# Patient Record
Sex: FEMALE | Race: WHITE
[De-identification: names, ages, dates, MRNs, and addresses within clinical notes are randomized per-mention and may not be internally consistent; named-entity substitution may affect disease eponyms.]

---

## 2017-04-12 ENCOUNTER — HOSPITAL ENCOUNTER (OUTPATIENT)
Dept: HOSPITAL 62 - SC | Age: 27
Discharge: HOME | End: 2017-04-12
Attending: OTOLARYNGOLOGY
Payer: OTHER GOVERNMENT

## 2017-04-12 DIAGNOSIS — Z79.1: ICD-10-CM

## 2017-04-12 DIAGNOSIS — Z79.899: ICD-10-CM

## 2017-04-12 DIAGNOSIS — F90.9: ICD-10-CM

## 2017-04-12 DIAGNOSIS — F17.210: ICD-10-CM

## 2017-04-12 DIAGNOSIS — J35.1: Primary | ICD-10-CM

## 2017-04-12 DIAGNOSIS — F32.9: ICD-10-CM

## 2017-04-12 DIAGNOSIS — M19.90: ICD-10-CM

## 2017-04-12 PROCEDURE — 42826 REMOVAL OF TONSILS: CPT

## 2017-04-12 PROCEDURE — 0CTPXZZ RESECTION OF TONSILS, EXTERNAL APPROACH: ICD-10-PCS | Performed by: OTOLARYNGOLOGY

## 2017-04-12 PROCEDURE — 88304 TISSUE EXAM BY PATHOLOGIST: CPT

## 2017-05-20 ENCOUNTER — INPATIENT (INPATIENT)
Facility: HOSPITAL | Age: 27
LOS: 8 days | Discharge: NURSING, REHAB OR LONG-TERM CARE | DRG: 902 | End: 2017-05-29
Attending: HOSPITALIST | Admitting: INTERNAL MEDICINE
Payer: OTHER GOVERNMENT

## 2017-05-20 VITALS
RESPIRATION RATE: 18 BRPM | OXYGEN SATURATION: 99 % | HEIGHT: 66 IN | TEMPERATURE: 98 F | HEART RATE: 89 BPM | WEIGHT: 190.04 LBS | SYSTOLIC BLOOD PRESSURE: 124 MMHG | DIASTOLIC BLOOD PRESSURE: 80 MMHG

## 2017-05-20 DIAGNOSIS — F32.9 MAJOR DEPRESSIVE DISORDER, SINGLE EPISODE, UNSPECIFIED: ICD-10-CM

## 2017-05-20 DIAGNOSIS — F17.200 NICOTINE DEPENDENCE, UNSPECIFIED, UNCOMPLICATED: ICD-10-CM

## 2017-05-20 DIAGNOSIS — L03.115 CELLULITIS OF RIGHT LOWER LIMB: ICD-10-CM

## 2017-05-20 DIAGNOSIS — T81.4XXA INFECTION FOLLOWING A PROCEDURE, INITIAL ENCOUNTER: ICD-10-CM

## 2017-05-20 DIAGNOSIS — S82.891A OTHER FRACTURE OF RIGHT LOWER LEG, INITIAL ENCOUNTER FOR CLOSED FRACTURE: Chronic | ICD-10-CM

## 2017-05-20 DIAGNOSIS — Z29.9 ENCOUNTER FOR PROPHYLACTIC MEASURES, UNSPECIFIED: ICD-10-CM

## 2017-05-20 LAB
ANION GAP SERPL CALC-SCNC: 11 MMOL/L — SIGNIFICANT CHANGE UP (ref 5–17)
BUN SERPL-MCNC: 15 MG/DL — SIGNIFICANT CHANGE UP (ref 8–20)
CALCIUM SERPL-MCNC: 8.8 MG/DL — SIGNIFICANT CHANGE UP (ref 8.6–10.2)
CHLORIDE SERPL-SCNC: 102 MMOL/L — SIGNIFICANT CHANGE UP (ref 98–107)
CO2 SERPL-SCNC: 25 MMOL/L — SIGNIFICANT CHANGE UP (ref 22–29)
CREAT SERPL-MCNC: 0.64 MG/DL — SIGNIFICANT CHANGE UP (ref 0.5–1.3)
CRP SERPL-MCNC: 11.1 MG/DL — HIGH (ref 0–0.4)
ERYTHROCYTE [SEDIMENTATION RATE] IN BLOOD: 48 MM/HR — HIGH (ref 0–20)
GLUCOSE SERPL-MCNC: 105 MG/DL — SIGNIFICANT CHANGE UP (ref 70–115)
GRAM STN FLD: SIGNIFICANT CHANGE UP
HCT VFR BLD CALC: 35.5 % — LOW (ref 37–47)
HGB BLD-MCNC: 11.9 G/DL — LOW (ref 12–16)
LACTATE BLDV-MCNC: 1.4 MMOL/L — SIGNIFICANT CHANGE UP (ref 0.7–2)
MCHC RBC-ENTMCNC: 33.1 PG — HIGH (ref 27–31)
MCHC RBC-ENTMCNC: 33.5 G/DL — SIGNIFICANT CHANGE UP (ref 32–36)
MCV RBC AUTO: 98.6 FL — SIGNIFICANT CHANGE UP (ref 81–99)
PLATELET # BLD AUTO: 295 K/UL — SIGNIFICANT CHANGE UP (ref 150–400)
POTASSIUM SERPL-MCNC: 4 MMOL/L — SIGNIFICANT CHANGE UP (ref 3.5–5.3)
POTASSIUM SERPL-SCNC: 4 MMOL/L — SIGNIFICANT CHANGE UP (ref 3.5–5.3)
RBC # BLD: 3.6 M/UL — LOW (ref 4.4–5.2)
RBC # FLD: 12.8 % — SIGNIFICANT CHANGE UP (ref 11–15.6)
SODIUM SERPL-SCNC: 138 MMOL/L — SIGNIFICANT CHANGE UP (ref 135–145)
SPECIMEN SOURCE: SIGNIFICANT CHANGE UP
WBC # BLD: 12.6 K/UL — HIGH (ref 4.8–10.8)
WBC # FLD AUTO: 12.6 K/UL — HIGH (ref 4.8–10.8)

## 2017-05-20 PROCEDURE — 99284 EMERGENCY DEPT VISIT MOD MDM: CPT

## 2017-05-20 PROCEDURE — 73590 X-RAY EXAM OF LOWER LEG: CPT | Mod: 26,RT

## 2017-05-20 PROCEDURE — 99222 1ST HOSP IP/OBS MODERATE 55: CPT

## 2017-05-20 PROCEDURE — 99223 1ST HOSP IP/OBS HIGH 75: CPT

## 2017-05-20 RX ORDER — CEFAZOLIN SODIUM 1 G
2000 VIAL (EA) INJECTION EVERY 8 HOURS
Qty: 0 | Refills: 0 | Status: DISCONTINUED | OUTPATIENT
Start: 2017-05-20 | End: 2017-05-23

## 2017-05-20 RX ORDER — VENLAFAXINE HCL 75 MG
0 CAPSULE, EXT RELEASE 24 HR ORAL
Qty: 0 | Refills: 0 | COMMUNITY

## 2017-05-20 RX ORDER — HYDROMORPHONE HYDROCHLORIDE 2 MG/ML
0.5 INJECTION INTRAMUSCULAR; INTRAVENOUS; SUBCUTANEOUS EVERY 8 HOURS
Qty: 0 | Refills: 0 | Status: DISCONTINUED | OUTPATIENT
Start: 2017-05-20 | End: 2017-05-23

## 2017-05-20 RX ORDER — KETOROLAC TROMETHAMINE 30 MG/ML
30 SYRINGE (ML) INJECTION ONCE
Qty: 0 | Refills: 0 | Status: DISCONTINUED | OUTPATIENT
Start: 2017-05-20 | End: 2017-05-20

## 2017-05-20 RX ORDER — VANCOMYCIN HCL 1 G
1250 VIAL (EA) INTRAVENOUS ONCE
Qty: 0 | Refills: 0 | Status: COMPLETED | OUTPATIENT
Start: 2017-05-20 | End: 2017-05-20

## 2017-05-20 RX ORDER — SODIUM CHLORIDE 9 MG/ML
1000 INJECTION INTRAMUSCULAR; INTRAVENOUS; SUBCUTANEOUS ONCE
Qty: 0 | Refills: 0 | Status: COMPLETED | OUTPATIENT
Start: 2017-05-20 | End: 2017-05-20

## 2017-05-20 RX ORDER — MORPHINE SULFATE 50 MG/1
4 CAPSULE, EXTENDED RELEASE ORAL ONCE
Qty: 0 | Refills: 0 | Status: DISCONTINUED | OUTPATIENT
Start: 2017-05-20 | End: 2017-05-20

## 2017-05-20 RX ORDER — ARIPIPRAZOLE 15 MG/1
5 TABLET ORAL DAILY
Qty: 0 | Refills: 0 | Status: DISCONTINUED | OUTPATIENT
Start: 2017-05-21 | End: 2017-05-23

## 2017-05-20 RX ORDER — CEFAZOLIN SODIUM 1 G
VIAL (EA) INJECTION
Qty: 0 | Refills: 0 | Status: DISCONTINUED | OUTPATIENT
Start: 2017-05-20 | End: 2017-05-23

## 2017-05-20 RX ORDER — VANCOMYCIN HCL 1 G
1000 VIAL (EA) INTRAVENOUS ONCE
Qty: 0 | Refills: 0 | Status: DISCONTINUED | OUTPATIENT
Start: 2017-05-20 | End: 2017-05-20

## 2017-05-20 RX ORDER — ARIPIPRAZOLE 15 MG/1
0 TABLET ORAL
Qty: 0 | Refills: 0 | COMMUNITY

## 2017-05-20 RX ORDER — VENLAFAXINE HCL 75 MG
275 CAPSULE, EXT RELEASE 24 HR ORAL
Qty: 0 | Refills: 0 | COMMUNITY

## 2017-05-20 RX ORDER — ARIPIPRAZOLE 15 MG/1
5 TABLET ORAL DAILY
Qty: 0 | Refills: 0 | Status: DISCONTINUED | OUTPATIENT
Start: 2017-05-20 | End: 2017-05-20

## 2017-05-20 RX ORDER — DEXTROAMPHETAMINE SACCHARATE, AMPHETAMINE ASPARTATE, DEXTROAMPHETAMINE SULFATE AND AMPHETAMINE SULFATE 1.875; 1.875; 1.875; 1.875 MG/1; MG/1; MG/1; MG/1
20 TABLET ORAL DAILY
Qty: 0 | Refills: 0 | Status: DISCONTINUED | OUTPATIENT
Start: 2017-05-21 | End: 2017-05-23

## 2017-05-20 RX ORDER — VANCOMYCIN HCL 1 G
1250 VIAL (EA) INTRAVENOUS EVERY 8 HOURS
Qty: 0 | Refills: 0 | Status: DISCONTINUED | OUTPATIENT
Start: 2017-05-20 | End: 2017-05-23

## 2017-05-20 RX ORDER — ENOXAPARIN SODIUM 100 MG/ML
40 INJECTION SUBCUTANEOUS DAILY
Qty: 0 | Refills: 0 | Status: DISCONTINUED | OUTPATIENT
Start: 2017-05-21 | End: 2017-05-22

## 2017-05-20 RX ORDER — NICOTINE POLACRILEX 2 MG
1 GUM BUCCAL DAILY
Qty: 0 | Refills: 0 | Status: DISCONTINUED | OUTPATIENT
Start: 2017-05-20 | End: 2017-05-23

## 2017-05-20 RX ORDER — DEXTROAMPHETAMINE SACCHARATE, AMPHETAMINE ASPARTATE, DEXTROAMPHETAMINE SULFATE AND AMPHETAMINE SULFATE 1.875; 1.875; 1.875; 1.875 MG/1; MG/1; MG/1; MG/1
0 TABLET ORAL
Qty: 0 | Refills: 0 | COMMUNITY

## 2017-05-20 RX ORDER — VANCOMYCIN HCL 1 G
VIAL (EA) INTRAVENOUS
Qty: 0 | Refills: 0 | Status: DISCONTINUED | OUTPATIENT
Start: 2017-05-20 | End: 2017-05-23

## 2017-05-20 RX ORDER — CEFAZOLIN SODIUM 1 G
2000 VIAL (EA) INJECTION ONCE
Qty: 0 | Refills: 0 | Status: COMPLETED | OUTPATIENT
Start: 2017-05-20 | End: 2017-05-20

## 2017-05-20 RX ORDER — SODIUM CHLORIDE 9 MG/ML
1000 INJECTION INTRAMUSCULAR; INTRAVENOUS; SUBCUTANEOUS
Qty: 0 | Refills: 0 | Status: DISCONTINUED | OUTPATIENT
Start: 2017-05-20 | End: 2017-05-21

## 2017-05-20 RX ORDER — ACETAMINOPHEN 500 MG
650 TABLET ORAL EVERY 6 HOURS
Qty: 0 | Refills: 0 | Status: DISCONTINUED | OUTPATIENT
Start: 2017-05-20 | End: 2017-05-23

## 2017-05-20 RX ORDER — VANCOMYCIN HCL 1 G
1250 VIAL (EA) INTRAVENOUS EVERY 12 HOURS
Qty: 0 | Refills: 0 | Status: DISCONTINUED | OUTPATIENT
Start: 2017-05-20 | End: 2017-05-20

## 2017-05-20 RX ADMIN — Medication 100 MILLIGRAM(S): at 18:08

## 2017-05-20 RX ADMIN — Medication 30 MILLIGRAM(S): at 20:06

## 2017-05-20 RX ADMIN — Medication 30 MILLIGRAM(S): at 15:01

## 2017-05-20 RX ADMIN — SODIUM CHLORIDE 1000 MILLILITER(S): 9 INJECTION INTRAMUSCULAR; INTRAVENOUS; SUBCUTANEOUS at 13:49

## 2017-05-20 RX ADMIN — MORPHINE SULFATE 4 MILLIGRAM(S): 50 CAPSULE, EXTENDED RELEASE ORAL at 20:12

## 2017-05-20 RX ADMIN — MORPHINE SULFATE 4 MILLIGRAM(S): 50 CAPSULE, EXTENDED RELEASE ORAL at 18:49

## 2017-05-20 RX ADMIN — Medication 166.67 MILLIGRAM(S): at 18:49

## 2017-05-20 RX ADMIN — SODIUM CHLORIDE 75 MILLILITER(S): 9 INJECTION INTRAMUSCULAR; INTRAVENOUS; SUBCUTANEOUS at 18:49

## 2017-05-20 NOTE — H&P ADULT - HISTORY OF PRESENT ILLNESS
26 y.o Female with PMH s/p R ankle fx and s/p ORIF right tib/fib May 2016 (had a plate and 9 screws placed) with recent hardware removal 2 weeks ago (some hardware still remains in place) on May 1st, 2017 by orthopedic Sx Dr. Sims in [place], thereafter on Monday the patient had sutures removed and then Tuesday flew to [place]. Pt has noted RLE erythema, swelling and drainage from the incision site x 2 days. Patient also reports subjective fever/chills x 1 day. Patient denies trauma, weakness, neurological deficit, N/V, chest pain or any other complaints. In ER pt evaluated by other and given kefzol and vancomycin.

## 2017-05-20 NOTE — ED STATDOCS - PROGRESS NOTE DETAILS
Pt seen and evaluated. 25 yo f s/p ORIF right tib/fib with recent some hardware removed 2 weeks ago by MD in [place]. Pt here visiting- returning in 1.5 weeks. Pt presents to ED with c/o fevers last night and redness, swelling, pain and drainage from site. Pt had sutures removed Monday and states incision was closed, but opened yesterday with drainage.  +HA. NO N/V. No CP, palp. Right lower leg with open wound with incision with drainage. + STS + erythema and warmth to mid tib area. No streaking. XR reviewed - possible subq air- d/w Ortho PA who will eval pt Ortho eval pt who d/w Dr hyde- would like pt admitted to medical service. Hospitalist Dr Mcneil accepts pt

## 2017-05-20 NOTE — H&P ADULT - PROBLEM SELECTOR PLAN 3
-Pt counseled on smoking cessation and would like nicotine replacement therapy   -c/w nicotic patch QD -Pt counseled on smoking cessation and would like nicotine replacement therapy   -c/w nicotic patch QD  -f/u urine drug screen

## 2017-05-20 NOTE — ED STATDOCS - SKIN, MLM
scar healed alteral aspect. medial aspect erythema, scar area of wound edges not together, serosanguinous drainage. swelling to shin and medial malleolus

## 2017-05-20 NOTE — ED ADULT TRIAGE NOTE - CHIEF COMPLAINT QUOTE
States had hardware removal from right leg 2 weeks ago, and states "I am pretty sure it is infected." States it is swollen, red, and drainage present. Hospitals in [place] took aleve this morning. States she is visiting from [place] and surgery was performed in [place]

## 2017-05-20 NOTE — ED ADULT NURSE NOTE - OBJECTIVE STATEMENT
Patient found laying in stretcher, awake ,alert, and oriented times 3, breathing unlabored.  Patient recently had hardware removed from right ankle.  Patient broke tib/fib.  Site, red, swollen, hot to touch.  Patient states subjective fevers yesterday.  Pain to area.  Open areas where sutures were removed.  Hardware removed May 1st.  Patient " fell off bridge" in [place]

## 2017-05-20 NOTE — ED ADULT NURSE NOTE - CHIEF COMPLAINT QUOTE
States had hardware removal from right leg 2 weeks ago, and states "I am pretty sure it is infected." States it is swollen, red, and drainage present. [facility] took aleve this morning. States she is visiting from [place] and surgery was performed in [place]

## 2017-05-20 NOTE — H&P ADULT - ASSESSMENT
26 y.o. F w/ PMH depression, s/p R ankle fx and s/p ORIF right tib/fib May 2016 (had a plate and 9 screws placed) with recent hardware removal 2 weeks ago (some hardware still remains in place) on May 1st, 2017 and suture removal on 5/15/17, presents with RLE swelling, erythema and discharge admitted w/ RLE cellulitis.    , thereafter on Monday the patient had sutures removed and then Tuesday flew to [place]. Pt has noted RLE erythema, swelling and drainage from the incision site x 2 days. Patient also reports subjective fever/chills x 1 day. Patient denies traum

## 2017-05-20 NOTE — CONSULT NOTE ADULT - SUBJECTIVE AND OBJECTIVE BOX
Consult Note:   · Provider Specialty	Plastic Surgery    Referral/Consultation:   Initial Consult:  · Requested by Name:	ED staff  · Date/Time:	20-May-2017 20:19  · Reason for Referral/Consultation:	RLE erythema/swelling, wound dehiscence      · Subjective and Objective:   Pt Name: DARREL ALEGRIA    MRN: [id number]      Patient is a 26y Female presenting to the emergency department c/o RLE erythema, redness, swelling x 2 days. Patient states she had an ORIF done in [place] on May 3rd of last year (2016), after breaking her ankle s/p fall from bridge and [place] on [place]. Patient states she had some hardware removed approx 2 weeks ago due to pain ( a plate with 9 screws) however some hardware remains. Patient states she went to her primary orthopedist's office on Monday to have the sutures removed, and then came up to [place] on Tuesday to visit. Patient states 2 days ago, she noticed some redness, swelling, and discharge from the incision site. Patient c/o subjective fever/chills yesterday, but states none today. Patient denies numbness/tingling. Denies trauma. Denies other orthopedic complaints. Patient states she will be returning to [place] in 1.5 weeks.      REVIEW OF SYSTEMS      General:	c/o subjective fevers, chills    Respiratory and Thorax: denies SOB  	  Cardiovascular:	denies CP    Gastrointestinal:	 denies abdominal pain    Musculoskeletal:	c/o RLE pain/swelling    Neurological:	denies numbness/tingling  	    ROS is otherwise negative.      PAST MEDICAL & SURGICAL HISTORY: see HPI    Allergies: No Known Allergies      Medications:   abilify  effexor  adderall      FAMILY HISTORY:  : non-contributory    Social History:   denies illicit drug use  Ambulation: Walking independently [ X] With Cane [ ] With Walker [ ]  Bedbound [ ]                           11.9   12.6  )-----------( 295      ( 20 May 2017 14:35 )             35.5     05-20    138  |  102  |  15.0  ----------------------------<  105  4.0   |  25.0  |  0.64    Ca    8.8      20 May 2017 14:35        PHYSICAL EXAM:    Vital Signs Last 24 Hrs  T(C): 36.4, Max: 36.4 (05-20 @ 12:52)  T(F): 97.5, Max: 97.5 (05-20 @ 12:52)  HR: 89 (89 - 89)  BP: 124/80 (124/80 - 124/80)  BP(mean): --  RR: 18 (18 - 18)  SpO2: 99% (99% - 99%)  Daily Height in cm: 167.64 (20 May 2017 12:52)    Daily     RLE: Medial surgical site dehiscence noted in 3 small areas distally. + serous drainage noted. Lateral surgical site healed well, no erythema, no drainage. + lymphangitic streaking spreading proximally mid tibia. does not involve posterior aspect of leg. does not extend distally to foot. sensation in tact to light touch. DP 2+. compartments soft. + dorsi/plantarflexion without pain. No TTP knee. Calf soft NT B/L.    Xray right ankle: (+) hardware noted. no acute bony fracture/dislocation noted.    A/P:  Pt is a  26y Female with right lower extremity cellulitis, s/p removal of hardware 2 weeks ago    At this point in time would continue iv abx as per id  spontaneously draining wound no loss of domain of overlying skin with tensionless advancement  consider iodoform packing with daily packing change if does not respond may need subsequent i and d as per orthio  thank you  yaquelin kirby

## 2017-05-20 NOTE — H&P ADULT - NSHPSOCIALHISTORY_GEN_ALL_CORE
Lives in [place]  Socially drinks beer every couple weeks  Smokes cigarettes 1 pack a month, denies drug use or herbal supplement use

## 2017-05-20 NOTE — H&P ADULT - RS GEN PE MLT RESP DETAILS PC
clear to auscultation bilaterally/breath sounds equal/good air movement/airway patent/no rhonchi/no rales/no wheezes

## 2017-05-20 NOTE — ED STATDOCS - NS ED MD SCRIBE ATTENDING SCRIBE SECTIONS
DISPOSITION/INTAKE ASSESSMENT/SCREENINGS/HISTORY OF PRESENT ILLNESS/HIV/REVIEW OF SYSTEMS/PAST MEDICAL/SURGICAL/SOCIAL HISTORY/PHYSICAL EXAM/VITAL SIGNS( Pullset)

## 2017-05-20 NOTE — ED STATDOCS - OBJECTIVE STATEMENT
27 y/o F pt presents to ED c/o right lower extremity pain associated with swelling and drainage from area starting 2 days ago.  Pt has hardware removed from right lower extremity because it was painful  2 weeks ago in [place]. Pain worsens when she ambulates. She returns to [place] next week.  Pt states positive subjective fevers yesterday. No abx recently. Denies SOB, chest pain, chills. No further complaints at this time.

## 2017-05-20 NOTE — H&P ADULT - PROBLEM SELECTOR PLAN 1
-Pt currently afebrile  -leukocytosis present and elevated esr/crp   -Noted purulent discharge/redness and swelling on exam/ ROM intact  -Ortho consult noted and appreciated  -plastics consult for wound care management as per ortho Dr. Peña consulted   -ID consulted as per ortho and pt started on kefzol 2g q8h and vancomycin 1250mg q12h   -will f/u wound cx and Bcx    -As per ortho no plan for OR at this time  -c/w tylenol for mild pain, percoset for moderate pain and dilaudid for severe pain  -c/w stool softeners as needed -Pt currently afebrile  -leukocytosis present and elevated esr/crp   -Noted purulent discharge/redness and swelling on exam/ ROM intact  -Ortho consult noted and appreciated  -plastics consult for wound care management as per ortho Dr. Peña consulted   -ID consulted as per ortho and pt started on kefzol 2g q8h and vancomycin 1250mg q12h   -will f/u wound cx and Bcx    -As per ortho no plan for OR at this time  -c/w tylenol for mild pain, percoset for moderate pain and dilaudid for severe pain  -c/w stool softeners as needed  -Will monitor infection for now if no clinical improvement will consider further imaging. Will d/w ID. In the mean time f/u urine pregnancy test.

## 2017-05-20 NOTE — ED STATDOCS - ATTENDING CONTRIBUTION TO CARE
I, Myrtle Nava, performed the initial face to face bedside interview with this patient regarding history of present illness, review of symptoms and relevant past medical, social and family history.  I completed an independent physical examination.  I was the initial provider who evaluated this patient. I have signed out the follow up of any pending tests (i.e. labs, radiological studies) to the ACP.  I have communicated the patient’s plan of care and disposition with the ACP.  The history, relevant review of systems, past medical and surgical history, medical decision making, and physical examination was documented by the scribe in my presence and I attest to the accuracy of the documentation.

## 2017-05-20 NOTE — CONSULT NOTE ADULT - SUBJECTIVE AND OBJECTIVE BOX
Pt Name: DARREL ALEGRIA    MRN: [id number]      Patient is a 26y Female presenting to the emergency department c/o RLE erythema, redness, swelling x 2 days. Patient states she had an ORIF done in [place] on May 3rd of last year (2016), after breaking her ankle s/p fall [place] bridge [place] [email or web address]. Patient states she had some hardware removed approx 2 weeks ago due to pain ( a plate with 9 screws) however some hardware remains. Patient states she went to her primary orthopedist's office on Monday to have the sutures removed, and then came up to [place] on Tuesday to visit. Patient states 2 days ago, she noticed some redness, swelling, and discharge from the incision site. Patient c/o subjective fever/chills yesterday, but states none today. Patient denies numbness/tingling. Denies trauma. Denies other orthopedic complaints. Patient states she will be returning to [place] in 1.5 weeks.      REVIEW OF SYSTEMS      General:	c/o subjective fevers, chills    Respiratory and Thorax: denies SOB  	  Cardiovascular:	denies CP    Gastrointestinal:	 denies abdominal pain    Musculoskeletal:	c/o RLE pain/swelling    Neurological:	denies numbness/tingling  	    ROS is otherwise negative.      PAST MEDICAL & SURGICAL HISTORY: see HPI    Allergies: No Known Allergies      Medications:   abilify  effexor  adderall      FAMILY HISTORY:  : non-contributory    Social History:   denies illicit drug use  Ambulation: Walking independently [ X] With Cane [ ] With Walker [ ]  Bedbound [ ]                           11.9   12.6  )-----------( 295      ( 20 May 2017 14:35 )             35.5     05-20    138  |  102  |  15.0  ----------------------------<  105  4.0   |  25.0  |  0.64    Ca    8.8      20 May 2017 14:35        PHYSICAL EXAM:    Vital Signs Last 24 Hrs  T(C): 36.4, Max: 36.4 (05-20 @ 12:52)  T(F): 97.5, Max: 97.5 (05-20 @ 12:52)  HR: 89 (89 - 89)  BP: 124/80 (124/80 - 124/80)  BP(mean): --  RR: 18 (18 - 18)  SpO2: 99% (99% - 99%)  Daily Height in cm: 167.64 (20 May 2017 12:52)    Daily     RLE: Medial surgical site dehiscence noted in 3 small areas distally. + purulent drainage noted. Lateral surgical site healed well, no erythema, no drainage. + erythema spreading proximally mid tibia. does not involve posterior aspect of leg. does not extend distally to foot. no streaking. sensation in tact to light touch. DP 2+. compartments soft. + dorsi/plantarflexion without pain. No TTP knee. Calf soft NT B/L.    Xray right ankle: (+) hardware noted. no acute bony fracture/dislocation noted.    A/P:  Pt is a  26y Female with right lower extremity cellulitis, s/p removal of hardware 2 weeks ago    PLAN: D/W Dr. Maradiaga  * admit to medicine  * recommend plastics consult for wound care management  * ID to follow - kefzol 2g q8h and vanco 1250mg q12h as per Dr. Cruz  * cultures taken, F/U results  * continue to monitor for improvement, no plan for OR at this time Pt Name: DARREL ALEGRIA    MRN: [id number]      Patient is a 26y Female presenting to the emergency department c/o RLE erythema, redness, swelling x 2 days. Patient states she had an ORIF done in [place] on May 3rd of last year (2016), after breaking her ankle s/p fall from bridge and landing on [employer]. Patient states she had some hardware removed approx 2 weeks ago due to pain ( a plate with 9 screws) however some hardware remains. Patient states she went to her primary orthopedist's office on Monday to have the sutures removed, and then came up to [place] on Tuesday to visit. Patient states 2 days ago, she noticed some redness, swelling, and discharge from the incision site. Patient c/o subjective fever/chills yesterday, but states none today. Patient denies numbness/tingling. Denies trauma. Denies other orthopedic complaints. Patient states she will be returning to [place] in 1.5 weeks.      REVIEW OF SYSTEMS      General:	c/o subjective fevers, chills    Respiratory and Thorax: denies SOB  	  Cardiovascular:	denies CP    Gastrointestinal:	 denies abdominal pain    Musculoskeletal:	c/o RLE pain/swelling    Neurological:	denies numbness/tingling  	    ROS is otherwise negative.      PAST MEDICAL & SURGICAL HISTORY: see HPI    Allergies: No Known Allergies      Medications:   abilify  effexor  adderall      FAMILY HISTORY:  : non-contributory    Social History:   denies illicit drug use  Ambulation: Walking independently [ X] With Cane [ ] With Walker [ ]  Bedbound [ ]                           11.9   12.6  )-----------( 295      ( 20 May 2017 14:35 )             35.5     05-20    138  |  102  |  15.0  ----------------------------<  105  4.0   |  25.0  |  0.64    Ca    8.8      20 May 2017 14:35        PHYSICAL EXAM:    Vital Signs Last 24 Hrs  T(C): 36.4, Max: 36.4 (05-20 @ 12:52)  T(F): 97.5, Max: 97.5 (05-20 @ 12:52)  HR: 89 (89 - 89)  BP: 124/80 (124/80 - 124/80)  BP(mean): --  RR: 18 (18 - 18)  SpO2: 99% (99% - 99%)  Daily Height in cm: 167.64 (20 May 2017 12:52)    Daily     RLE: Medial surgical site dehiscence noted in 3 small areas distally. + purulent drainage noted. Lateral surgical site healed well, no erythema, no drainage. + erythema spreading proximally mid tibia. does not involve posterior aspect of leg. does not extend distally to foot. no streaking. sensation in tact to light touch. DP 2+. compartments soft. + dorsi/plantarflexion without pain. No TTP knee. Calf soft NT B/L.    Xray right ankle: (+) hardware noted. no acute bony fracture/dislocation noted.    A/P:  Pt is a  26y Female with right lower extremity cellulitis, s/p removal of hardware 2 weeks ago    PLAN: D/W Dr. Maradiaga  * admit to medicine  * recommend plastics consult for wound care management (Dr. Peña consulted, to eval patient)  * ID to follow - kefzol 2g q8h and vanco 1250mg q12h as per Dr. Cruz  * cultures taken, F/U results  * continue to monitor for improvement, no plan for OR at this time Pt Name: DARREL ALEGRIA    MRN: [id number]      Patient is a 26y Female presenting to the emergency department c/o RLE erythema, redness, swelling x 2 days. Patient states she had an ORIF done in [place] on May 3rd of last year (2016), after breaking her ankle s/p fall [place] bridge [place] [email or web address]. Patient states she had some hardware removed approx 2 weeks ago due to pain ( a plate with 9 screws) however some hardware remains. Patient states she went to her primary orthopedist's office on Monday to have the sutures removed, and then came up to [place] on Tuesday to visit. Patient states 2 days ago, she noticed some redness, swelling, and discharge from the incision site. Patient c/o subjective fever/chills yesterday, but states none today. Patient denies numbness/tingling. Denies trauma. Denies other orthopedic complaints. Patient states she will be returning to [place] in 1.5 weeks.      REVIEW OF SYSTEMS      General:	c/o subjective fevers, chills    Respiratory and Thorax: denies SOB  	  Cardiovascular:	denies CP    Gastrointestinal:	 denies abdominal pain    Musculoskeletal:	c/o RLE pain/swelling    Neurological:	denies numbness/tingling  	    ROS is otherwise negative.      PAST MEDICAL & SURGICAL HISTORY: see HPI    Allergies: No Known Allergies      Medications:   abilify  effexor  adderall      FAMILY HISTORY:  : non-contributory    Social History:   denies illicit drug use  Ambulation: Walking independently [ X] With Cane [ ] With Walker [ ]  Bedbound [ ]                           11.9   12.6  )-----------( 295      ( 20 May 2017 14:35 )             35.5     05-20    138  |  102  |  15.0  ----------------------------<  105  4.0   |  25.0  |  0.64    Ca    8.8      20 May 2017 14:35        PHYSICAL EXAM:    Vital Signs Last 24 Hrs  T(C): 36.4, Max: 36.4 (05-20 @ 12:52)  T(F): 97.5, Max: 97.5 (05-20 @ 12:52)  HR: 89 (89 - 89)  BP: 124/80 (124/80 - 124/80)  BP(mean): --  RR: 18 (18 - 18)  SpO2: 99% (99% - 99%)  Daily Height in cm: 167.64 (20 May 2017 12:52)    Daily     RLE: Medial surgical site dehiscence noted in 3 small areas distally. + purulent drainage noted. Lateral surgical site healed well, no erythema, no drainage. + erythema spreading proximally mid tibia. does not involve posterior aspect of leg. does not extend distally to foot. no streaking. sensation in tact to light touch. DP 2+. compartments soft. + dorsi/plantarflexion without pain. No TTP knee. Calf soft NT B/L.    Xray right ankle: (+) hardware noted. no acute bony fracture/dislocation noted.    A/P:  Pt is a  26y Female with right lower extremity cellulitis, s/p removal of hardware 2 weeks ago    PLAN: D/W Dr. Alfred  PLease refer to dictated consult note by Dr. Alfred  * admit to medicine  * recommend plastics consult for wound care management (Dr. Peña consulted, to eval patient)  * ID to follow - kefzol 2g q8h and vanco 1250mg q12h as per Dr. Cruz  * cultures taken, F/U results  * continue to monitor for improvement, no plan for OR at this time    I feel it is in the patients interest to return to [place] sooner than planned so that she can have continuity of care with her surgeon.  If this wound does not heal with local wound care, she may need multple procedures including further hardware removal and it does not seem to make sense to pursue that here in [place] without the ability to provide post-operative care.    I presented several options to the patient.  She will discuss with her family and decide if she wants to return home ASAP or continue care here in [place].  If she plans to continue care here, she would need to remain in [place] for min of 3-4 weeks depend on her pos-operative progress.    Shalom Alfred

## 2017-05-21 LAB
ALBUMIN SERPL ELPH-MCNC: 3.3 G/DL — SIGNIFICANT CHANGE UP (ref 3.3–5.2)
ALP SERPL-CCNC: 62 U/L — SIGNIFICANT CHANGE UP (ref 40–120)
ALT FLD-CCNC: 10 U/L — SIGNIFICANT CHANGE UP
AMPHET UR-MCNC: NEGATIVE — SIGNIFICANT CHANGE UP
ANION GAP SERPL CALC-SCNC: 12 MMOL/L — SIGNIFICANT CHANGE UP (ref 5–17)
ANISOCYTOSIS BLD QL: SLIGHT — SIGNIFICANT CHANGE UP
AST SERPL-CCNC: 13 U/L — SIGNIFICANT CHANGE UP
BARBITURATES UR SCN-MCNC: NEGATIVE — SIGNIFICANT CHANGE UP
BASOPHILS # BLD AUTO: 0 K/UL — SIGNIFICANT CHANGE UP (ref 0–0.2)
BASOPHILS NFR BLD AUTO: 0.2 % — SIGNIFICANT CHANGE UP (ref 0–2)
BENZODIAZ UR-MCNC: NEGATIVE — SIGNIFICANT CHANGE UP
BILIRUB SERPL-MCNC: 0.3 MG/DL — LOW (ref 0.4–2)
BUN SERPL-MCNC: 6 MG/DL — LOW (ref 8–20)
CALCIUM SERPL-MCNC: 8.7 MG/DL — SIGNIFICANT CHANGE UP (ref 8.6–10.2)
CHLORIDE SERPL-SCNC: 105 MMOL/L — SIGNIFICANT CHANGE UP (ref 98–107)
CHOLEST SERPL-MCNC: 104 MG/DL — LOW (ref 110–199)
CO2 SERPL-SCNC: 24 MMOL/L — SIGNIFICANT CHANGE UP (ref 22–29)
COCAINE METAB.OTHER UR-MCNC: NEGATIVE — SIGNIFICANT CHANGE UP
CREAT SERPL-MCNC: 0.58 MG/DL — SIGNIFICANT CHANGE UP (ref 0.5–1.3)
EOSINOPHIL # BLD AUTO: 0.1 K/UL — SIGNIFICANT CHANGE UP (ref 0–0.5)
EOSINOPHIL NFR BLD AUTO: 1.3 % — SIGNIFICANT CHANGE UP (ref 0–6)
GLUCOSE SERPL-MCNC: 101 MG/DL — SIGNIFICANT CHANGE UP (ref 70–115)
HBA1C BLD-MCNC: 4.6 % — SIGNIFICANT CHANGE UP (ref 4–5.6)
HCG UR QL: NEGATIVE — SIGNIFICANT CHANGE UP
HCT VFR BLD CALC: 30.3 % — LOW (ref 37–47)
HDLC SERPL-MCNC: 59 MG/DL — LOW
HGB BLD-MCNC: 10.1 G/DL — LOW (ref 12–16)
HYPOCHROMIA BLD QL: SLIGHT — SIGNIFICANT CHANGE UP
LIPID PNL WITH DIRECT LDL SERPL: 37 MG/DL — SIGNIFICANT CHANGE UP
LYMPHOCYTES # BLD AUTO: 1.9 K/UL — SIGNIFICANT CHANGE UP (ref 1–4.8)
LYMPHOCYTES # BLD AUTO: 17 % — LOW (ref 20–55)
MACROCYTES BLD QL: SLIGHT — SIGNIFICANT CHANGE UP
MCHC RBC-ENTMCNC: 33 PG — HIGH (ref 27–31)
MCHC RBC-ENTMCNC: 33.3 G/DL — SIGNIFICANT CHANGE UP (ref 32–36)
MCV RBC AUTO: 99 FL — SIGNIFICANT CHANGE UP (ref 81–99)
METHADONE UR-MCNC: NEGATIVE — SIGNIFICANT CHANGE UP
MICROCYTES BLD QL: SLIGHT — SIGNIFICANT CHANGE UP
MONOCYTES # BLD AUTO: 1 K/UL — HIGH (ref 0–0.8)
MONOCYTES NFR BLD AUTO: 9.4 % — SIGNIFICANT CHANGE UP (ref 3–10)
NEUTROPHILS # BLD AUTO: 8 K/UL — SIGNIFICANT CHANGE UP (ref 1.8–8)
NEUTROPHILS NFR BLD AUTO: 71.8 % — SIGNIFICANT CHANGE UP (ref 37–73)
OPIATES UR-MCNC: NEGATIVE — SIGNIFICANT CHANGE UP
PCP SPEC-MCNC: SIGNIFICANT CHANGE UP
PCP UR-MCNC: NEGATIVE — SIGNIFICANT CHANGE UP
PLAT MORPH BLD: NORMAL — SIGNIFICANT CHANGE UP
PLATELET # BLD AUTO: 251 K/UL — SIGNIFICANT CHANGE UP (ref 150–400)
POIKILOCYTOSIS BLD QL AUTO: SLIGHT — SIGNIFICANT CHANGE UP
POTASSIUM SERPL-MCNC: 4.2 MMOL/L — SIGNIFICANT CHANGE UP (ref 3.5–5.3)
POTASSIUM SERPL-SCNC: 4.2 MMOL/L — SIGNIFICANT CHANGE UP (ref 3.5–5.3)
PROCALCITONIN SERPL-MCNC: 0.05 NG/ML — SIGNIFICANT CHANGE UP (ref 0–0.05)
PROT SERPL-MCNC: 6.4 G/DL — LOW (ref 6.6–8.7)
RBC # BLD: 3.06 M/UL — LOW (ref 4.4–5.2)
RBC # FLD: 13.2 % — SIGNIFICANT CHANGE UP (ref 11–15.6)
RBC BLD AUTO: ABNORMAL
SODIUM SERPL-SCNC: 141 MMOL/L — SIGNIFICANT CHANGE UP (ref 135–145)
THC UR QL: NEGATIVE — SIGNIFICANT CHANGE UP
TOTAL CHOLESTEROL/HDL RATIO MEASUREMENT: 2 RATIO — LOW (ref 3.3–7.1)
TRIGL SERPL-MCNC: 41 MG/DL — SIGNIFICANT CHANGE UP (ref 10–200)
VANCOMYCIN TROUGH SERPL-MCNC: 11.6 UG/ML — SIGNIFICANT CHANGE UP (ref 10–20)
WBC # BLD: 11.2 K/UL — HIGH (ref 4.8–10.8)
WBC # FLD AUTO: 11.2 K/UL — HIGH (ref 4.8–10.8)

## 2017-05-21 PROCEDURE — 99233 SBSQ HOSP IP/OBS HIGH 50: CPT

## 2017-05-21 PROCEDURE — 99223 1ST HOSP IP/OBS HIGH 75: CPT

## 2017-05-21 RX ORDER — VENLAFAXINE HCL 75 MG
225 CAPSULE, EXT RELEASE 24 HR ORAL DAILY
Qty: 0 | Refills: 0 | Status: DISCONTINUED | OUTPATIENT
Start: 2017-05-21 | End: 2017-05-23

## 2017-05-21 RX ORDER — VENLAFAXINE HCL 75 MG
225 CAPSULE, EXT RELEASE 24 HR ORAL DAILY
Qty: 0 | Refills: 0 | Status: DISCONTINUED | OUTPATIENT
Start: 2017-05-21 | End: 2017-05-21

## 2017-05-21 RX ADMIN — Medication 1 PATCH: at 12:00

## 2017-05-21 RX ADMIN — Medication 166.67 MILLIGRAM(S): at 20:15

## 2017-05-21 RX ADMIN — DEXTROAMPHETAMINE SACCHARATE, AMPHETAMINE ASPARTATE, DEXTROAMPHETAMINE SULFATE AND AMPHETAMINE SULFATE 20 MILLIGRAM(S): 1.875; 1.875; 1.875; 1.875 TABLET ORAL at 12:24

## 2017-05-21 RX ADMIN — HYDROMORPHONE HYDROCHLORIDE 0.5 MILLIGRAM(S): 2 INJECTION INTRAMUSCULAR; INTRAVENOUS; SUBCUTANEOUS at 08:53

## 2017-05-21 RX ADMIN — Medication 166.67 MILLIGRAM(S): at 03:42

## 2017-05-21 RX ADMIN — Medication 100 MILLIGRAM(S): at 17:20

## 2017-05-21 RX ADMIN — Medication 225 MILLIGRAM(S): at 12:24

## 2017-05-21 RX ADMIN — ENOXAPARIN SODIUM 40 MILLIGRAM(S): 100 INJECTION SUBCUTANEOUS at 12:24

## 2017-05-21 RX ADMIN — SODIUM CHLORIDE 75 MILLILITER(S): 9 INJECTION INTRAMUSCULAR; INTRAVENOUS; SUBCUTANEOUS at 01:27

## 2017-05-21 RX ADMIN — SODIUM CHLORIDE 75 MILLILITER(S): 9 INJECTION INTRAMUSCULAR; INTRAVENOUS; SUBCUTANEOUS at 08:47

## 2017-05-21 RX ADMIN — ARIPIPRAZOLE 5 MILLIGRAM(S): 15 TABLET ORAL at 12:24

## 2017-05-21 RX ADMIN — Medication 650 MILLIGRAM(S): at 18:41

## 2017-05-21 RX ADMIN — Medication 166.67 MILLIGRAM(S): at 10:40

## 2017-05-21 RX ADMIN — HYDROMORPHONE HYDROCHLORIDE 0.5 MILLIGRAM(S): 2 INJECTION INTRAMUSCULAR; INTRAVENOUS; SUBCUTANEOUS at 09:44

## 2017-05-21 RX ADMIN — Medication 100 MILLIGRAM(S): at 02:42

## 2017-05-21 RX ADMIN — Medication 100 MILLIGRAM(S): at 10:10

## 2017-05-21 NOTE — PROGRESS NOTE ADULT - PROBLEM SELECTOR PLAN 2
-will confirm dosages of medications   -c/w abilify 5mg po qd   -c/w effexor xl po qd -c/w abilify 5mg po qd   -c/w effexor xl 225mg po qd

## 2017-05-21 NOTE — PROGRESS NOTE ADULT - ASSESSMENT
26 y.o. F w/ PMH depression, s/p R ankle fx and s/p ORIF right tib/fib May 2016 (had a plate and 9 screws placed) with recent hardware removal 2 weeks ago (some hardware still remains in place) on May 1st, 2017 and suture removal on 5/15/17, presents with RLE swelling, erythema and discharge admitted w/ RLE cellulitis. 26 y.o. F w/ PMH depression, s/p R ankle fx and s/p ORIF right tib/fib May 2016 (had a plate and 9 screws placed) with recent hardware removal 2 weeks ago (some hardware still remains in place) on May 1st, 2017 and suture removal on 5/15/17, presents with RLE swelling, erythema and discharge admitted w/ RLE cellulitis, unclear extent of underlying infection.

## 2017-05-21 NOTE — PROGRESS NOTE ADULT - PROBLEM SELECTOR PLAN 3
-Pt counseled on smoking cessation and would like nicotine replacement therapy   -c/w nicotic patch QD  -f/u urine drug screen -Pt counseled on smoking cessation and would like nicotine replacement therapy   -c/w nicotic patch QD  -Drug screen negative

## 2017-05-21 NOTE — PROGRESS NOTE ADULT - SUBJECTIVE AND OBJECTIVE BOX
pt seen and re evaluated in er  continuous drainage identified  induration appreciated to proximal pre tibial region as well  pt has discussed possible return to her orthopedic surgeon  have discussed case in detail with id  thank you  yaquelin kirby

## 2017-05-21 NOTE — PROGRESS NOTE ADULT - SUBJECTIVE AND OBJECTIVE BOX
Pt Name: DARREL ALEGRIA    MRN: [id number]      Patient is a being followed for left medial ankle wound dehiscence following recent hardware removal after an ankle fracture was sustained in May 2016. She is visiting [place] from [place]. She reports that the wound and drainage has not changed significantly since yesterday.      PAST MEDICAL & SURGICAL HISTORY:  PAST MEDICAL & SURGICAL HISTORY:  Depression  Ankle fracture: RLE S/P ORIF plate w/ 9 screws and recent hardware removal  Ankle fracture, right: s/p ORIF w/ plate + 9 screws      Allergies: No Known Allergies      Medications: ceFAZolin   IVPB  IV Intermittent   ceFAZolin   IVPB 2000milliGRAM(s) IV Intermittent every 8 hours  vancomycin  IVPB  IV Intermittent   vancomycin  IVPB 1250milliGRAM(s) IV Intermittent every 8 hours  acetaminophen   Tablet 650milliGRAM(s) Oral every 6 hours PRN  oxyCODONE  5 mG/acetaminophen 325 mG 2Tablet(s) Oral every 6 hours PRN  HYDROmorphone   Tablet 0.5milliGRAM(s) Oral every 8 hours PRN  sodium chloride 0.9%. 1000milliLiter(s) IV Continuous <Continuous>  nicotine -   7 mG/24Hr(s) Patch 1patch Transdermal daily  ARIPiprazole 5milliGRAM(s) Oral daily  enoxaparin Injectable 40milliGRAM(s) SubCutaneous daily  amphetamine/dextroamphetamine 20milliGRAM(s) Oral daily                                11.9   12.6  )-----------( 295      ( 20 May 2017 14:35 )             35.5     05-20    138  |  102  |  15.0  ----------------------------<  105  4.0   |  25.0  |  0.64    Ca    8.8      20 May 2017 14:35      Culture - Other (05.20.17 @ 15:24)    Gram Stain:   No White blood cells  Numerous Gram Positive Cocci in Pairs and Chains    Specimen Source: .Other right LE surgical wound        PHYSICAL EXAM:    Vital Signs Last 24 Hrs  T(C): 36.7, Max: 37 (05-20 @ 19:00)  T(F): 98, Max: 98.6 (05-20 @ 19:00)  HR: 76 (76 - 89)  BP: 126/70 (105/62 - 132/82)  BP(mean): --  RR: 18 (18 - 18)  SpO2: 99% (99% - 100%)  Daily Height in cm: 167.64 (20 May 2017 12:52)    Daily     Appearance: Alert, responsive, in no acute distress.    Neurological: Sensation is grossly intact to light touch. 5/5 motor function of all extremities. No focal deficits or weaknesses found.    Skin: + intermittent areas of wound dehiscence of the medial left ankle surgical wound. + mild cloudy discharge expressed from openings. No bleeding. No abrasions.     Vascular: 2+ distal pulses. Cap refill < 2 sec. No signs of venous insufficiency or stasis. No extremity ulcerations. No cyanosis.    Musculoskeletal:          Left Lower Extremity: + mild left medial ankle tenderness.  No calf tenderness.       A/P:  Pt is a  26y Female with left medial ankle surgical wound dehiscence after recent hardware removal    PLAN:   * site cleaned and new dressing applied.  * continue IV antibiotics  * Patient to consider surgical management in [place] vs back at home in [place].  * WBAT of the ankle

## 2017-05-21 NOTE — PROGRESS NOTE ADULT - SUBJECTIVE AND OBJECTIVE BOX
Patient is a 26y old  Female who presents with a chief complaint of RLE swelling, redness and discharge (20 May 2017 16:39)      HEALTH ISSUES - PROBLEM Dx:  Prophylactic measure: Prophylactic measure  Smoker: Smoker  Depression: Depression  Cellulitis of right ankle: Cellulitis of right ankle      INTERVAL HPI/OVERNIGHT EVENTS:  Patient seen and examined at bedside. No acute events overnight. Patient states she is feeling well, pain is well controlled on the pain medication regimen. D/w her with ID Dr. Carr in regards to the plan of care and the patient is trying to determine if it is better to resume care in [place] vs [place] where her primary orthopedic sx is. Patient denies chest pain, SOB, abd pain, N/V, fever, chills, dysuria or any other complaints. All remainder ROS negative.     Vital Signs Last 24 Hrs  T(C): 36.7, Max: 37 (05-20 @ 19:00)  T(F): 98, Max: 98.6 (05-20 @ 19:00)  HR: 90 (76 - 90)  BP: 119/71 (105/62 - 132/82)  BP(mean): --  RR: 18 (18 - 18)  SpO2: 100% (99% - 100%)        CONSTITUTIONAL: Well appearing, well nourished, awake, alert and in no apparent distress  CARDIAC: Normal rate, regular rhythm.  Heart sounds S1, S2.  No murmurs, rubs or gallops   RESPIRATORY: Breath sounds clear and equal bilaterally. No wheezes, rhales or rhonchi  GASTROENTEROLOGY: Soft, NT ND BS+ normoactive   EXTREMITIES: No edema, cyanosis or deformity   NEUROLOGICAL: Alert and oriented, no focal deficits, no motor or sensory deficits.  SKIN: No rash, skin turgor     MEDICATIONS  (STANDING):  ceFAZolin   IVPB  IV Intermittent   ceFAZolin   IVPB 2000milliGRAM(s) IV Intermittent every 8 hours  vancomycin  IVPB  IV Intermittent   vancomycin  IVPB 1250milliGRAM(s) IV Intermittent every 8 hours  nicotine -   7 mG/24Hr(s) Patch 1patch Transdermal daily  ARIPiprazole 5milliGRAM(s) Oral daily  enoxaparin Injectable 40milliGRAM(s) SubCutaneous daily  amphetamine/dextroamphetamine 20milliGRAM(s) Oral daily  venlafaxine XR. 225milliGRAM(s) Oral daily    MEDICATIONS  (PRN):  acetaminophen   Tablet 650milliGRAM(s) Oral every 6 hours PRN For Temp greater than 38 C (100.4 F) or mild pain 1-3  oxyCODONE  5 mG/acetaminophen 325 mG 2Tablet(s) Oral every 6 hours PRN Moderate Pain (4 - 6)  HYDROmorphone   Tablet 0.5milliGRAM(s) Oral every 8 hours PRN Severe Pain (7 - 10)      LABS:                        10.1   11.2  )-----------( 251      ( 21 May 2017 09:46 )             30.3     05-21    141  |  105  |  6.0<L>  ----------------------------<  101  4.2   |  24.0  |  0.58    Ca    8.7      21 May 2017 09:46    TPro  6.4<L>  /  Alb  3.3  /  TBili  0.3<L>  /  DBili  x   /  AST  13  /  ALT  10  /  AlkPhos  62  05-21        LIVER FUNCTIONS - ( 21 May 2017 09:46 )  Alb: 3.3 g/dL / Pro: 6.4 g/dL / ALK PHOS: 62 U/L / ALT: 10 U/L / AST: 13 U/L / GGT: x             RADIOLOGY & ADDITIONAL TESTS:  No new imaging studies Patient is a 26y old  Female who presents with a chief complaint of RLE swelling, redness and discharge (20 May 2017 16:39)      HEALTH ISSUES - PROBLEM Dx:  Prophylactic measure: Prophylactic measure  Smoker: Smoker  Depression: Depression  Cellulitis of right ankle: Cellulitis of right ankle      INTERVAL HPI/OVERNIGHT EVENTS:  Patient seen and examined at bedside. No acute events overnight. Patient states she is feeling well, pain is well controlled on the pain medication regimen. D/w her with ID Dr. Carr in regards to the plan of care and the patient is trying to determine if it is better to resume care in [place] vs [place] where her primary orthopedic sx is. Patient denies chest pain, SOB, abd pain, N/V, fever, chills, dysuria or any other complaints. All remainder ROS negative.     Vital Signs Last 24 Hrs  T(C): 36.7, Max: 37 (05-20 @ 19:00)  T(F): 98, Max: 98.6 (05-20 @ 19:00)  HR: 90 (76 - 90)  BP: 119/71 (105/62 - 132/82)  BP(mean): --  RR: 18 (18 - 18)  SpO2: 100% (99% - 100%)        CONSTITUTIONAL: Well appearing, well nourished, awake, alert and in no apparent distress  CARDIAC: Normal rate, regular rhythm.  Heart sounds S1, S2.  No murmurs, rubs or gallops   RESPIRATORY: Breath sounds clear and equal bilaterally. No wheezes, rhales or rhonchi  GASTROENTEROLOGY: Soft, NT ND BS+ normoactive   EXTREMITIES: No edema or cyanosis, RLE ankle w/ wound dehiscence, slight yellowish purulent discharge, redness persists and induration present   : R groin LAD +ve   NEUROLOGICAL: Alert and oriented, no focal deficits, no motor or sensory deficits.  SKIN: No rash, skin turgor wnl    MEDICATIONS  (STANDING):  ceFAZolin   IVPB  IV Intermittent   ceFAZolin   IVPB 2000milliGRAM(s) IV Intermittent every 8 hours  vancomycin  IVPB  IV Intermittent   vancomycin  IVPB 1250milliGRAM(s) IV Intermittent every 8 hours  nicotine -   7 mG/24Hr(s) Patch 1patch Transdermal daily  ARIPiprazole 5milliGRAM(s) Oral daily  enoxaparin Injectable 40milliGRAM(s) SubCutaneous daily  amphetamine/dextroamphetamine 20milliGRAM(s) Oral daily  venlafaxine XR. 225milliGRAM(s) Oral daily    MEDICATIONS  (PRN):  acetaminophen   Tablet 650milliGRAM(s) Oral every 6 hours PRN For Temp greater than 38 C (100.4 F) or mild pain 1-3  oxyCODONE  5 mG/acetaminophen 325 mG 2Tablet(s) Oral every 6 hours PRN Moderate Pain (4 - 6)  HYDROmorphone   Tablet 0.5milliGRAM(s) Oral every 8 hours PRN Severe Pain (7 - 10)      LABS:                        10.1   11.2  )-----------( 251      ( 21 May 2017 09:46 )             30.3     05-21    141  |  105  |  6.0<L>  ----------------------------<  101  4.2   |  24.0  |  0.58    Ca    8.7      21 May 2017 09:46    TPro  6.4<L>  /  Alb  3.3  /  TBili  0.3<L>  /  DBili  x   /  AST  13  /  ALT  10  /  AlkPhos  62  05-21        LIVER FUNCTIONS - ( 21 May 2017 09:46 )  Alb: 3.3 g/dL / Pro: 6.4 g/dL / ALK PHOS: 62 U/L / ALT: 10 U/L / AST: 13 U/L / GGT: x             RADIOLOGY & ADDITIONAL TESTS:  No new imaging studies

## 2017-05-21 NOTE — CONSULT NOTE ADULT - SUBJECTIVE AND OBJECTIVE BOX
[facility] KANDI BARRETO MD FACP   CHRISTIAN PIERSON MD  Diplomates American Board of Internal Medicine and Infecctious Diseases  [phone]  [id number] RADHA [id number]         26 y.o Female with PMH s/p R ankle fx and s/p ORIF right tib/fib May 2016 (had a plate and 9 screws placed) with recent hardware removal 2 weeks ago (some hardware still remains in place) on May 1st, 2017 by orthopedic Sx Dr. Sims in [place],   PT JOI ACTIVE DUTY  MARINE    Monday the patient had sutures removed and then Tuesday flew to [place]. Pt has noted RLE erythema, swelling and drainage from the incision site x 2 days. Patient also reports subjective fever/chills x 1 day. Patient denies trauma, weakness, neurological deficit, N/V, chest pain or any other complaints. PT SEEN BY ORTHO IV ABX STARTED   ASKED TO EVALUATE FROM ID STANDPOINT     PAST MEDICAL & SURGICAL HISTORY:  Depression  Ankle fracture: RLE S/P ORIF plate w/ 9 screws and recent hardware removal  Ankle fracture, right: s/p ORIF w/ plate + 9 screws      ANTIBIOTICS  ceFAZolin   IVPB  IV Intermittent   ceFAZolin   IVPB 2000milliGRAM(s) IV Intermittent every 8 hours  vancomycin  IVPB  IV Intermittent   vancomycin  IVPB 1250milliGRAM(s) IV Intermittent every 8 hours      Allergies    No Known Allergies    Intolerances        SOCIAL HISTORY:    FAMILY HISTORY:  No significant family history      Vital Signs Last 24 Hrs  T(C): 36.7, Max: 37 (05-20 @ 19:00)  T(F): 98, Max: 98.6 (05-20 @ 19:00)  HR: 76 (76 - 89)  BP: 126/70 (105/62 - 132/82)  BP(mean): --  RR: 18 (18 - 18)  SpO2: 99% (99% - 100%)  Drug Dosing Weight  Height (cm): 167.6 (20 May 2017 12:52)  Weight (kg): 86.2 (20 May 2017 12:52)  BMI (kg/m2): 30.7 (20 May 2017 12:52)  BSA (m2): 1.96 (20 May 2017 12:52)      REVIEW OF SYSTEMS:    CONSTITUTIONAL:  As per HPI.    HEENT:  Eyes:  No diplopia or blurred vision. ENT:  No earache, sore throat or runny nose.    CARDIOVASCULAR:  No pressure, squeezing, strangling, tightness, heaviness or aching about the chest, neck, axilla or epigastrium.    RESPIRATORY:  No cough, shortness of breath, PND or orthopnea.    GASTROINTESTINAL:  No nausea, vomiting or diarrhea.    GENITOURINARY:  No dysuria, frequency or urgency.    MUSCULOSKELETAL:  As per HPI.    SKIN:  No change in skin, hair or nails.    NEUROLOGIC:  No paresthesias, fasciculations, seizures or weakness.                  PHYSICAL EXAMINATION:    GENERAL: The patient is a well-developed, well-nourished  FEMALE IN NAD  VITAL SIGNS: T(C): 36.7, Max: 37 (05-20 @ 19:00)  HR: 76 (76 - 89)  BP: 126/70 (105/62 - 132/82)  RR: 18 (18 - 18)  SpO2: 99% (99% - 100%)  Wt(kg): --    HEENT: Head is normocephalic and atraumatic.  ANICTERIC  NECK: Supple. No carotid bruits.  No lymphadenopathy or thyromegaly.    LUNGS:COARSE BREATH SOUNDS    HEART: Regular rate and rhythm without murmur.    ABDOMEN: Soft, nontender, and nondistended.  Positive bowel sounds.  No hepatosplenomegaly was noted. NO REBOUND NO GUARDING    EXTREMITIES: RIGHT LOWER EXT WITH SURGICAL SITE WITH SOME OPENING  SOME HARDWARE CAN BE SEEN NO ODOR MINIMAL DRAINAGE    NEUROLOGIC: NON FOCAL      SKIN: No ulceration or induration present. NO RASH        BLOOD CULTURES       URINE CX          LABS:                        10.1   11.2  )-----------( 251      ( 21 May 2017 09:46 )             30.3     05-21    141  |  105  |  6.0<L>  ----------------------------<  101  4.2   |  24.0  |  0.58    Ca    8.7      21 May 2017 09:46    TPro  6.4<L>  /  Alb  3.3  /  TBili  0.3<L>  /  DBili  x   /  AST  13  /  ALT  10  /  AlkPhos  62  05-21          RADIOLOGY & ADDITIONAL STUDIES:      ASSESSMENT/PLAN  26 y.o Female with PMH s/p R ankle fx and s/p ORIF right tib/fib May 2016 (had a plate and 9 screws placed) with recent hardware removal 2 weeks ago (some hardware still remains in place) on May 1st, 2017 by orthopedic Sx Dr. Sims in [place],   PT JOI ACTIVE DUTY  MARINE    Monday the patient had sutures removed and then Tuesday flew to [place]. Pt has noted RLE erythema, swelling and drainage from the incision site x 2 days. Patient also reports subjective fever/chills x 1 day. Patient denies trauma, weakness, neurological deficit, N/V, chest pain or any other complaints. PT SEEN BY ORTHO IV ABX STARTED   PT WITH RECENT REMOVAL BY HARDWARE AT [facility] IN [facility]  PT WILL LIKELY NEED CLEAN OUT AND POSSIBLE REMOVAL OF HARDWARE  AND PLASTIC SURGERY   PT WILL BENEFIT FROM TREATMENT  AT [facility] AS THIS MAY BE A PROLONGED HOSPITAL STAY AND HER ULTIMATE FOLLOW UP WILL BE IN [place]  AND THIS SHOULD BE ACCOMPLISHED THERE  ORTHO TO CONTACT ORTHO IN [place]  WILL CONTINUE ABX AND CHECK CULTURES WILL FOLLOW UP  EXTENSIVE D/W PT              CHRISTIAN DEE MD

## 2017-05-21 NOTE — ED ADULT NURSE REASSESSMENT NOTE - NS ED NURSE REASSESS COMMENT FT1
received pt care from night shift RN, pt alert and awake x3, states that her pain is back, denies chest pain/sob, wound to right ankle, slough present, no foul smell, positive pulses, LS clear, IV patent, will continue to monitor.

## 2017-05-22 LAB
ANION GAP SERPL CALC-SCNC: 9 MMOL/L — SIGNIFICANT CHANGE UP (ref 5–17)
ANISOCYTOSIS BLD QL: SLIGHT — SIGNIFICANT CHANGE UP
BASOPHILS # BLD AUTO: 0 K/UL — SIGNIFICANT CHANGE UP (ref 0–0.2)
BASOPHILS NFR BLD AUTO: 1 % — SIGNIFICANT CHANGE UP (ref 0–2)
BUN SERPL-MCNC: 3 MG/DL — LOW (ref 8–20)
CALCIUM SERPL-MCNC: 8.7 MG/DL — SIGNIFICANT CHANGE UP (ref 8.6–10.2)
CHLORIDE SERPL-SCNC: 105 MMOL/L — SIGNIFICANT CHANGE UP (ref 98–107)
CO2 SERPL-SCNC: 27 MMOL/L — SIGNIFICANT CHANGE UP (ref 22–29)
CREAT SERPL-MCNC: 0.5 MG/DL — SIGNIFICANT CHANGE UP (ref 0.5–1.3)
EOSINOPHIL # BLD AUTO: 0.2 K/UL — SIGNIFICANT CHANGE UP (ref 0–0.5)
EOSINOPHIL NFR BLD AUTO: 3 % — SIGNIFICANT CHANGE UP (ref 0–5)
GLUCOSE SERPL-MCNC: 89 MG/DL — SIGNIFICANT CHANGE UP (ref 70–115)
HCT VFR BLD CALC: 33.1 % — LOW (ref 37–47)
HGB BLD-MCNC: 10.9 G/DL — LOW (ref 12–16)
LYMPHOCYTES # BLD AUTO: 2.3 K/UL — SIGNIFICANT CHANGE UP (ref 1–4.8)
LYMPHOCYTES # BLD AUTO: 28 % — SIGNIFICANT CHANGE UP (ref 20–55)
MACROCYTES BLD QL: SLIGHT — SIGNIFICANT CHANGE UP
MCHC RBC-ENTMCNC: 32.7 PG — HIGH (ref 27–31)
MCHC RBC-ENTMCNC: 32.9 G/DL — SIGNIFICANT CHANGE UP (ref 32–36)
MCV RBC AUTO: 99.4 FL — HIGH (ref 81–99)
MICROCYTES BLD QL: SLIGHT — SIGNIFICANT CHANGE UP
MONOCYTES # BLD AUTO: 0.8 K/UL — SIGNIFICANT CHANGE UP (ref 0–0.8)
MONOCYTES NFR BLD AUTO: 10 % — SIGNIFICANT CHANGE UP (ref 3–10)
NEUTROPHILS # BLD AUTO: 4.7 K/UL — SIGNIFICANT CHANGE UP (ref 1.8–8)
NEUTROPHILS NFR BLD AUTO: 55 % — SIGNIFICANT CHANGE UP (ref 37–73)
NEUTS BAND # BLD: 1 % — SIGNIFICANT CHANGE UP (ref 0–8)
PLAT MORPH BLD: NORMAL — SIGNIFICANT CHANGE UP
PLATELET # BLD AUTO: 280 K/UL — SIGNIFICANT CHANGE UP (ref 150–400)
POIKILOCYTOSIS BLD QL AUTO: SLIGHT — SIGNIFICANT CHANGE UP
POTASSIUM SERPL-MCNC: 4 MMOL/L — SIGNIFICANT CHANGE UP (ref 3.5–5.3)
POTASSIUM SERPL-SCNC: 4 MMOL/L — SIGNIFICANT CHANGE UP (ref 3.5–5.3)
RBC # BLD: 3.33 M/UL — LOW (ref 4.4–5.2)
RBC # FLD: 13.1 % — SIGNIFICANT CHANGE UP (ref 11–15.6)
RBC BLD AUTO: ABNORMAL
SODIUM SERPL-SCNC: 141 MMOL/L — SIGNIFICANT CHANGE UP (ref 135–145)
VARIANT LYMPHS # BLD: 2 % — SIGNIFICANT CHANGE UP (ref 0–6)
WBC # BLD: 8 K/UL — SIGNIFICANT CHANGE UP (ref 4.8–10.8)
WBC # FLD AUTO: 8 K/UL — SIGNIFICANT CHANGE UP (ref 4.8–10.8)

## 2017-05-22 PROCEDURE — 99233 SBSQ HOSP IP/OBS HIGH 50: CPT

## 2017-05-22 PROCEDURE — 93010 ELECTROCARDIOGRAM REPORT: CPT

## 2017-05-22 PROCEDURE — 99232 SBSQ HOSP IP/OBS MODERATE 35: CPT

## 2017-05-22 RX ORDER — ENOXAPARIN SODIUM 100 MG/ML
40 INJECTION SUBCUTANEOUS ONCE
Qty: 0 | Refills: 0 | Status: COMPLETED | OUTPATIENT
Start: 2017-05-22 | End: 2017-05-22

## 2017-05-22 RX ORDER — SODIUM CHLORIDE 9 MG/ML
1000 INJECTION INTRAMUSCULAR; INTRAVENOUS; SUBCUTANEOUS
Qty: 0 | Refills: 0 | Status: DISCONTINUED | OUTPATIENT
Start: 2017-05-22 | End: 2017-05-22

## 2017-05-22 RX ADMIN — ARIPIPRAZOLE 5 MILLIGRAM(S): 15 TABLET ORAL at 13:34

## 2017-05-22 RX ADMIN — ENOXAPARIN SODIUM 40 MILLIGRAM(S): 100 INJECTION SUBCUTANEOUS at 13:12

## 2017-05-22 RX ADMIN — Medication 1 PATCH: at 13:35

## 2017-05-22 RX ADMIN — DEXTROAMPHETAMINE SACCHARATE, AMPHETAMINE ASPARTATE, DEXTROAMPHETAMINE SULFATE AND AMPHETAMINE SULFATE 20 MILLIGRAM(S): 1.875; 1.875; 1.875; 1.875 TABLET ORAL at 13:12

## 2017-05-22 RX ADMIN — Medication 1 PATCH: at 13:33

## 2017-05-22 RX ADMIN — Medication 100 MILLIGRAM(S): at 02:51

## 2017-05-22 RX ADMIN — Medication 166.67 MILLIGRAM(S): at 15:56

## 2017-05-22 RX ADMIN — Medication 166.67 MILLIGRAM(S): at 03:31

## 2017-05-22 RX ADMIN — Medication 100 MILLIGRAM(S): at 18:32

## 2017-05-22 RX ADMIN — Medication 225 MILLIGRAM(S): at 13:35

## 2017-05-22 NOTE — PROGRESS NOTE ADULT - PROBLEM SELECTOR PLAN 1
-Pt currently afebrile  -leukocytosis normalized   - elevated esr/crp   -Ortho f/u noted and appreciated and pt to go for I&D in the am   -Wound cx thus far shows strep pyogenes A   -Bcx is pending results   -c/w cefazolin 2G IVPB Q8hrs   -c/w vancomycin 1250mg IVPB q8hrs  -vanco trough wnl   x ray shows questionable deep subcutaneous soft tissue gas is noted at the lateral distal tibia there is overlying soft tissue swelling. 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

## 2017-05-22 NOTE — PROGRESS NOTE ADULT - PROBLEM SELECTOR PLAN 3
-Pt counseled on smoking cessation and would like nicotine replacement therapy   -c/w nicotic patch QD  -Drug screen negative

## 2017-05-22 NOTE — PROGRESS NOTE ADULT - ASSESSMENT
26 y.o Female with PMH s/p R ankle fx and s/p ORIF right tib/fib May 2016 (had a plate and 9 screws placed) with recent hardware removal 2 weeks ago (some hardware still remains in place) on May 1st, 2017 by orthopedic Sx Dr. Sims in [place], thereafter on Monday the patient had sutures removed and then Tuesday flew to [place]. Pt has noted RLE erythema, swelling and drainage from the incision site x 2 days. Patient also reports subjective fever/chills x 1 day.  BLOOD CX PENDING WOUND CX PENDING  WILL FOLLOW UP  PT HAS BEEN SEN BY ORTHO [place]  WILL CONTINUE PRESENT REGIMEN FOR NOW  PROBABLE OR IN AM

## 2017-05-22 NOTE — PROGRESS NOTE ADULT - SUBJECTIVE AND OBJECTIVE BOX
DARREL ALEGRIA is a 26y Female with HPI:  26 y.o Female with PMH s/p R ankle fx and s/p ORIF right tib/fib May 2016 (had a plate and 9 screws placed) with recent hardware removal 2 weeks ago (some hardware still remains in place) on May 1st, 2017 by orthopedic Sx Dr. Sims in [place], thereafter on Monday the patient had sutures removed and then Tuesday flew to [place]. Pt has noted RLE erythema, swelling and drainage from the incision site x 2 days. Patient also reports subjective fever/chills x 1 day.  blood cx neg so far wound cx pending for possible or tommorow      Allergies:  No Known Allergies      Medications:  ceFAZolin   IVPB  IV Intermittent   ceFAZolin   IVPB 2000milliGRAM(s) IV Intermittent every 8 hours  vancomycin  IVPB  IV Intermittent   vancomycin  IVPB 1250milliGRAM(s) IV Intermittent every 8 hours  acetaminophen   Tablet 650milliGRAM(s) Oral every 6 hours PRN  oxyCODONE  5 mG/acetaminophen 325 mG 2Tablet(s) Oral every 6 hours PRN  HYDROmorphone   Tablet 0.5milliGRAM(s) Oral every 8 hours PRN  nicotine -   7 mG/24Hr(s) Patch 1patch Transdermal daily  ARIPiprazole 5milliGRAM(s) Oral daily  amphetamine/dextroamphetamine 20milliGRAM(s) Oral daily  venlafaxine XR. 225milliGRAM(s) Oral daily      ANTIBIOTICS: KEFZOL AND VANCO        Review of Systems: - Negative except as mentioned above     Physical Exam:  ICU Vital Signs Last 24 Hrs  T(C): 37, Max: 37 (05-22 @ 15:33)  T(F): 98.6, Max: 98.6 (05-22 @ 15:33)  HR: 70 (61 - 90)  BP: 117/70 (95/55 - 119/71)  BP(mean): --  ABP: --  ABP(mean): --  RR: 18 (18 - 18)  SpO2: 96% (96% - 100%)    GEN: NAD, pleasant  HEENT: normocephalic and atraumatic. EOMI. BERNARDO...  NECK: Supple. No carotid bruits.  No lymphadenopathy or thyromegaly.  LUNGS: Clear to auscultation.  HEART: Regular rate and rhythm without murmur.  ABDOMEN: Soft, nontender, and nondistended.  Positive bowel sounds.  No hepatosplenomegaly was noted.  NO REBOUND NO GUARDING  EXTREMITIES:RIGHT LEG WRAPPED   NEUROLOGIC: Cranial nerves II through XII are grossly intact.    SKIN: No ulceration or induration present.      Labs:  05-22    141  |  105  |  3.0<L>  ----------------------------<  89  4.0   |  27.0  |  0.50    Ca    8.7      22 May 2017 07:25    TPro  6.4<L>  /  Alb  3.3  /  TBili  0.3<L>  /  DBili  x   /  AST  13  /  ALT  10  /  AlkPhos  62  05-21                          10.9   8.0   )-----------( 280      ( 22 May 2017 07:25 )             33.1           LIVER FUNCTIONS - ( 21 May 2017 09:46 )  Alb: 3.3 g/dL / Pro: 6.4 g/dL / ALK PHOS: 62 U/L / ALT: 10 U/L / AST: 13 U/L / GGT: x               CAPILLARY BLOOD GLUCOSE

## 2017-05-22 NOTE — PROGRESS NOTE ADULT - SUBJECTIVE AND OBJECTIVE BOX
Pt Name: DARREL ALEGRIA    MRN: [id number]      Patient is a being followed for left medial ankle wound dehiscence following recent hardware removal after an ankle fracture was sustained in May 2016. She is visiting [place] from [place]. She reports that the wound and drainage has not changed significantly since yesterday. She does report of an improvement in pain.      PAST MEDICAL & SURGICAL HISTORY:  PAST MEDICAL & SURGICAL HISTORY:  Depression  Ankle fracture: RLE S/P ORIF plate w/ 9 screws and recent hardware removal  Ankle fracture, right: s/p ORIF w/ plate + 9 screws      Allergies: No Known Allergies      Medications: ceFAZolin   IVPB  IV Intermittent   ceFAZolin   IVPB 2000milliGRAM(s) IV Intermittent every 8 hours  vancomycin  IVPB  IV Intermittent   vancomycin  IVPB 1250milliGRAM(s) IV Intermittent every 8 hours  acetaminophen   Tablet 650milliGRAM(s) Oral every 6 hours PRN  oxyCODONE  5 mG/acetaminophen 325 mG 2Tablet(s) Oral every 6 hours PRN  HYDROmorphone   Tablet 0.5milliGRAM(s) Oral every 8 hours PRN  nicotine -   7 mG/24Hr(s) Patch 1patch Transdermal daily  ARIPiprazole 5milliGRAM(s) Oral daily  enoxaparin Injectable 40milliGRAM(s) SubCutaneous daily  amphetamine/dextroamphetamine 20milliGRAM(s) Oral daily  venlafaxine XR. 225milliGRAM(s) Oral daily        Ambulation: Walking independently [x] With Cane [ ] With Walker [ ]  Bedbound [ ]                           10.9   8.0   )-----------( 280      ( 22 May 2017 07:25 )             33.1     05-22    141  |  105  |  3.0<L>  ----------------------------<  89  4.0   |  27.0  |  0.50    Ca    8.7      22 May 2017 07:25    TPro  6.4<L>  /  Alb  3.3  /  TBili  0.3<L>  /  DBili  x   /  AST  13  /  ALT  10  /  AlkPhos  62  05-21      Culture - Other (05.20.17 @ 15:24)    Gram Stain:   No White blood cells  Numerous Gram Positive Cocci in Pairs and Chains    Specimen Source: .Other right LE surgical wound    Culture Results:   Numerous Streptococcus pyogenes (Group A)  Culture in progress        PHYSICAL EXAM:    Vital Signs Last 24 Hrs  T(C): 36.6, Max: 36.7 (05-21 @ 17:45)  T(F): 97.9, Max: 98 (05-21 @ 17:45)  HR: 61 (61 - 90)  BP: 100/50 (100/50 - 119/71)  BP(mean): --  RR: 18 (18 - 18)  SpO2: 97% (97% - 100%)  Daily     Daily     Appearance: Alert, responsive, in no acute distress.    Neurological: Sensation is grossly intact to light touch. 5/5 motor function of all extremities. No focal deficits or weaknesses found.    Skin: + intermittent areas of wound dehiscence of the medial left ankle surgical wound. + mild cloudy discharge expressed from openings. No bleeding. No abrasions. + increased warmth and redness of the mid-medial right lower leg with fluctuance noted.     Vascular: 2+ distal pulses. Cap refill < 2 sec. No signs of venous insufficiency or stasis. No extremity ulcerations. No cyanosis.    Musculoskeletal:          Left Lower Extremity: + mild left medial ankle tenderness.  No calf tenderness.       A/P:  Pt is a  26y Female with left medial ankle surgical wound dehiscence after recent hardware removal    PLAN:   * continue IV antibiotic plan  * sensitivity pending.  * new dressing applied  * possible I&D tomorrow if condition does not improve  * Patient wishes to have operative procedure done in [place] and then return to [place] when treatment is completed. She understands that the treatment will likely be longer than her current 1.5 wk  leave. Pt Name: DARREL ALEGRIA    MRN: [id number]      Patient is a being followed for left medial ankle wound dehiscence following recent hardware removal after an ankle fracture was sustained in May 2016. She is visiting [place] from [place]. She reports that the wound and drainage has not changed significantly since yesterday. She does report of an improvement in pain.      PAST MEDICAL & SURGICAL HISTORY:  PAST MEDICAL & SURGICAL HISTORY:  Depression  Ankle fracture: RLE S/P ORIF plate w/ 9 screws and recent hardware removal  Ankle fracture, right: s/p ORIF w/ plate + 9 screws      Allergies: No Known Allergies      Medications: ceFAZolin   IVPB  IV Intermittent   ceFAZolin   IVPB 2000milliGRAM(s) IV Intermittent every 8 hours  vancomycin  IVPB  IV Intermittent   vancomycin  IVPB 1250milliGRAM(s) IV Intermittent every 8 hours  acetaminophen   Tablet 650milliGRAM(s) Oral every 6 hours PRN  oxyCODONE  5 mG/acetaminophen 325 mG 2Tablet(s) Oral every 6 hours PRN  HYDROmorphone   Tablet 0.5milliGRAM(s) Oral every 8 hours PRN  nicotine -   7 mG/24Hr(s) Patch 1patch Transdermal daily  ARIPiprazole 5milliGRAM(s) Oral daily  enoxaparin Injectable 40milliGRAM(s) SubCutaneous daily  amphetamine/dextroamphetamine 20milliGRAM(s) Oral daily  venlafaxine XR. 225milliGRAM(s) Oral daily        Pregnancy Profile, Urine (05.21.17 @ 09:46)    Pregnancy Profile, Urine: Negative: There is potential for a false-negative result for very early pregnancies  or with gestational age 5-7 weeks or above. The quantitative measurement  of serum hCG provides the most sensitive and accurate assessment of  pregnancy status.    Ambulation: Walking independently [x] With Cane [ ] With Walker [ ]  Bedbound [ ]                           10.9   8.0   )-----------( 280      ( 22 May 2017 07:25 )             33.1     05-22    141  |  105  |  3.0<L>  ----------------------------<  89  4.0   |  27.0  |  0.50    Ca    8.7      22 May 2017 07:25    TPro  6.4<L>  /  Alb  3.3  /  TBili  0.3<L>  /  DBili  x   /  AST  13  /  ALT  10  /  AlkPhos  62  05-21      Culture - Other (05.20.17 @ 15:24)    Gram Stain:   No White blood cells  Numerous Gram Positive Cocci in Pairs and Chains    Specimen Source: .Other right LE surgical wound    Culture Results:   Numerous Streptococcus pyogenes (Group A)  Culture in progress      Pregnancy Profile, Urine: Negative: There is potential for a false-negative result for very early pregnancies  or with gestational age 5-7 weeks or above. The quantitative measurement  of serum hCG provides the most sensitive and accurate assessment of  pregnancy status. (05.21.17 @ 09:46)          PHYSICAL EXAM:    Vital Signs Last 24 Hrs  T(C): 36.6, Max: 36.7 (05-21 @ 17:45)  T(F): 97.9, Max: 98 (05-21 @ 17:45)  HR: 61 (61 - 90)  BP: 100/50 (100/50 - 119/71)  BP(mean): --  RR: 18 (18 - 18)  SpO2: 97% (97% - 100%)  Daily     Daily     Appearance: Alert, responsive, in no acute distress.    Neurological: Sensation is grossly intact to light touch. 5/5 motor function of all extremities. No focal deficits or weaknesses found.    Skin: + intermittent areas of wound dehiscence of the medial left ankle surgical wound. + mild cloudy discharge expressed from openings. No bleeding. No abrasions. + increased warmth and redness of the mid-medial right lower leg with fluctuance noted.     Vascular: 2+ distal pulses. Cap refill < 2 sec. No signs of venous insufficiency or stasis. No extremity ulcerations. No cyanosis.    Musculoskeletal:          Left Lower Extremity: + mild left medial ankle tenderness.  No calf tenderness.       A/P:  Pt is a  26y Female with left medial ankle surgical wound dehiscence after recent hardware removal    PLAN:   * continue IV antibiotic plan  * sensitivity pending.  * new dressing applied  * possible I&D tomorrow if condition does not improve  * Patient wishes to have operative procedure done in [place] and then return to [place] when treatment is completed. She understands that the treatment will likely be longer than her current 1.5 wk  leave.

## 2017-05-22 NOTE — PROGRESS NOTE ADULT - ASSESSMENT
26 y.o. F w/ PMH depression, s/p R ankle fx and s/p ORIF right tib/fib May 2016 (had a plate and 9 screws placed) with recent hardware removal 2 weeks ago (some hardware still remains in place) on May 1st, 2017 and suture removal on 5/15/17, presents with RLE swelling, erythema and discharge admitted w/ left medial ankle surgical wound dehiscence after recent hardware removal.

## 2017-05-22 NOTE — PROGRESS NOTE ADULT - SUBJECTIVE AND OBJECTIVE BOX
Patient is a 26y old  Female who presents with a chief complaint of RLE swelling, redness and discharge (20 May 2017 16:39)      HEALTH ISSUES - PROBLEM Dx:  Prophylactic measure: Prophylactic measure  Smoker: Smoker  Depression: Depression  Cellulitis of right ankle: Cellulitis of right ankle      INTERVAL HPI/OVERNIGHT EVENTS:  Patient seen and examined at bedside. No acute events overnight. Patient states she is feeling well, pain well controlled on pain medication. Pt also states she can ambulate with some degree of difficulty b/c of pain. Patient denies chest pain, SOB, abd pain, N/V, fever, chills, dysuria or any other complaints. All remainder ROS negative.     Vital Signs Last 24 Hrs  T(C): 36.4, Max: 36.7 (05-21 @ 17:45)  T(F): 97.5, Max: 98 (05-21 @ 17:45)  HR: 84 (61 - 90)  BP: 95/55 (95/55 - 119/71)  BP(mean): --  RR: 18 (18 - 18)  SpO2: 97% (97% - 100%)    CONSTITUTIONAL: Well appearing, well nourished, awake, alert and in no apparent distress  CARDIAC: Normal rate, regular rhythm.  Heart sounds S1, S2.  No murmurs, rubs or gallops   RESPIRATORY: Breath sounds clear and equal bilaterally. No wheezes, rhales or rhonchi  GASTROENTEROLOGY: Soft, NT ND BS+ normoactive   EXTREMITIES: No edema or cyanosis, RLE w/ ace wrap; C/D/I  : R groin LAD +ve   NEUROLOGICAL: Alert and oriented, no focal deficits, no motor or sensory deficits.  SKIN: No rash, skin turgor wnl    MEDICATIONS  (STANDING):  ceFAZolin   IVPB  IV Intermittent   ceFAZolin   IVPB 2000milliGRAM(s) IV Intermittent every 8 hours  vancomycin  IVPB  IV Intermittent   vancomycin  IVPB 1250milliGRAM(s) IV Intermittent every 8 hours  nicotine -   7 mG/24Hr(s) Patch 1patch Transdermal daily  ARIPiprazole 5milliGRAM(s) Oral daily  amphetamine/dextroamphetamine 20milliGRAM(s) Oral daily  venlafaxine XR. 225milliGRAM(s) Oral daily  sodium chloride 0.9%. 1000milliLiter(s) IV Continuous <Continuous>    MEDICATIONS  (PRN):  acetaminophen   Tablet 650milliGRAM(s) Oral every 6 hours PRN For Temp greater than 38 C (100.4 F) or mild pain 1-3  oxyCODONE  5 mG/acetaminophen 325 mG 2Tablet(s) Oral every 6 hours PRN Moderate Pain (4 - 6)  HYDROmorphone   Tablet 0.5milliGRAM(s) Oral every 8 hours PRN Severe Pain (7 - 10)      LABS:                        10.9   8.0   )-----------( 280      ( 22 May 2017 07:25 )             33.1     05-22    141  |  105  |  3.0<L>  ----------------------------<  89  4.0   |  27.0  |  0.50    Ca    8.7      22 May 2017 07:25    TPro  6.4<L>  /  Alb  3.3  /  TBili  0.3<L>  /  DBili  x   /  AST  13  /  ALT  10  /  AlkPhos  62  05-21        LIVER FUNCTIONS - ( 21 May 2017 09:46 )  Alb: 3.3 g/dL / Pro: 6.4 g/dL / ALK PHOS: 62 U/L / ALT: 10 U/L / AST: 13 U/L / GGT: x             RADIOLOGY & ADDITIONAL TESTS:  No new imaging studies

## 2017-05-23 LAB
-  AMPICILLIN/SULBACTAM: SIGNIFICANT CHANGE UP
-  CEFAZOLIN: SIGNIFICANT CHANGE UP
-  CIPROFLOXACIN: SIGNIFICANT CHANGE UP
-  CLINDAMYCIN: SIGNIFICANT CHANGE UP
-  ERYTHROMYCIN: SIGNIFICANT CHANGE UP
-  GENTAMICIN: SIGNIFICANT CHANGE UP
-  LEVOFLOXACIN: SIGNIFICANT CHANGE UP
-  MOXIFLOXACIN(AEROBIC): SIGNIFICANT CHANGE UP
-  OXACILLIN: SIGNIFICANT CHANGE UP
-  PENICILLIN: SIGNIFICANT CHANGE UP
-  RIFAMPIN: SIGNIFICANT CHANGE UP
-  TETRACYCLINE: SIGNIFICANT CHANGE UP
-  TRIMETHOPRIM/SULFAMETHOXAZOLE: SIGNIFICANT CHANGE UP
-  VANCOMYCIN: SIGNIFICANT CHANGE UP
ANION GAP SERPL CALC-SCNC: 10 MMOL/L — SIGNIFICANT CHANGE UP (ref 5–17)
BUN SERPL-MCNC: 5 MG/DL — LOW (ref 8–20)
CALCIUM SERPL-MCNC: 9.2 MG/DL — SIGNIFICANT CHANGE UP (ref 8.6–10.2)
CHLORIDE SERPL-SCNC: 100 MMOL/L — SIGNIFICANT CHANGE UP (ref 98–107)
CO2 SERPL-SCNC: 30 MMOL/L — HIGH (ref 22–29)
CREAT SERPL-MCNC: 0.57 MG/DL — SIGNIFICANT CHANGE UP (ref 0.5–1.3)
CRP SERPL-MCNC: 3.2 MG/DL — HIGH (ref 0–0.4)
CULTURE RESULTS: SIGNIFICANT CHANGE UP
EOSINOPHIL # BLD AUTO: 0.2 K/UL — SIGNIFICANT CHANGE UP (ref 0–0.5)
EOSINOPHIL NFR BLD AUTO: 2 % — SIGNIFICANT CHANGE UP (ref 0–5)
ERYTHROCYTE [SEDIMENTATION RATE] IN BLOOD: 55 MM/HR — HIGH (ref 0–20)
GLUCOSE SERPL-MCNC: 91 MG/DL — SIGNIFICANT CHANGE UP (ref 70–115)
GRAM STN FLD: SIGNIFICANT CHANGE UP
HCT VFR BLD CALC: 33.7 % — LOW (ref 37–47)
HGB BLD-MCNC: 11.3 G/DL — LOW (ref 12–16)
HYPOCHROMIA BLD QL: SLIGHT — SIGNIFICANT CHANGE UP
LYMPHOCYTES # BLD AUTO: 2.6 K/UL — SIGNIFICANT CHANGE UP (ref 1–4.8)
LYMPHOCYTES # BLD AUTO: 30 % — SIGNIFICANT CHANGE UP (ref 20–55)
MCHC RBC-ENTMCNC: 33.2 PG — HIGH (ref 27–31)
MCHC RBC-ENTMCNC: 33.5 G/DL — SIGNIFICANT CHANGE UP (ref 32–36)
MCV RBC AUTO: 99.1 FL — HIGH (ref 81–99)
METHOD TYPE: SIGNIFICANT CHANGE UP
MONOCYTES # BLD AUTO: 0.6 K/UL — SIGNIFICANT CHANGE UP (ref 0–0.8)
MONOCYTES NFR BLD AUTO: 9 % — SIGNIFICANT CHANGE UP (ref 3–10)
NEUTROPHILS # BLD AUTO: 3.7 K/UL — SIGNIFICANT CHANGE UP (ref 1.8–8)
NEUTROPHILS NFR BLD AUTO: 57 % — SIGNIFICANT CHANGE UP (ref 37–73)
NEUTS BAND # BLD: 2 % — SIGNIFICANT CHANGE UP (ref 0–8)
ORGANISM # SPEC MICROSCOPIC CNT: SIGNIFICANT CHANGE UP
ORGANISM # SPEC MICROSCOPIC CNT: SIGNIFICANT CHANGE UP
PLAT MORPH BLD: NORMAL — SIGNIFICANT CHANGE UP
PLATELET # BLD AUTO: 333 K/UL — SIGNIFICANT CHANGE UP (ref 150–400)
POTASSIUM SERPL-MCNC: 4.2 MMOL/L — SIGNIFICANT CHANGE UP (ref 3.5–5.3)
POTASSIUM SERPL-SCNC: 4.2 MMOL/L — SIGNIFICANT CHANGE UP (ref 3.5–5.3)
RBC # BLD: 3.4 M/UL — LOW (ref 4.4–5.2)
RBC # FLD: 12.8 % — SIGNIFICANT CHANGE UP (ref 11–15.6)
RBC BLD AUTO: ABNORMAL
SMUDGE CELLS # BLD: PRESENT — SIGNIFICANT CHANGE UP
SODIUM SERPL-SCNC: 140 MMOL/L — SIGNIFICANT CHANGE UP (ref 135–145)
SPECIMEN SOURCE: SIGNIFICANT CHANGE UP
VANCOMYCIN TROUGH SERPL-MCNC: 16.7 UG/ML — SIGNIFICANT CHANGE UP (ref 10–20)
WBC # BLD: 7.2 K/UL — SIGNIFICANT CHANGE UP (ref 4.8–10.8)
WBC # FLD AUTO: 7.2 K/UL — SIGNIFICANT CHANGE UP (ref 4.8–10.8)

## 2017-05-23 PROCEDURE — 27603 DRAIN LOWER LEG LESION: CPT | Mod: RT

## 2017-05-23 PROCEDURE — 12034 INTMD RPR S/TR/EXT 7.6-12.5: CPT | Mod: 59,RT

## 2017-05-23 PROCEDURE — 99232 SBSQ HOSP IP/OBS MODERATE 35: CPT

## 2017-05-23 PROCEDURE — 99233 SBSQ HOSP IP/OBS HIGH 50: CPT

## 2017-05-23 PROCEDURE — 13160 SEC CLSR SURG WND/DEHSN XTN: CPT | Mod: RT

## 2017-05-23 RX ORDER — ARIPIPRAZOLE 15 MG/1
5 TABLET ORAL DAILY
Qty: 0 | Refills: 0 | Status: DISCONTINUED | OUTPATIENT
Start: 2017-05-23 | End: 2017-05-29

## 2017-05-23 RX ORDER — ACETAMINOPHEN 500 MG
650 TABLET ORAL EVERY 6 HOURS
Qty: 0 | Refills: 0 | Status: DISCONTINUED | OUTPATIENT
Start: 2017-05-23 | End: 2017-05-29

## 2017-05-23 RX ORDER — HYDROMORPHONE HYDROCHLORIDE 2 MG/ML
1 INJECTION INTRAMUSCULAR; INTRAVENOUS; SUBCUTANEOUS EVERY 6 HOURS
Qty: 0 | Refills: 0 | Status: DISCONTINUED | OUTPATIENT
Start: 2017-05-23 | End: 2017-05-23

## 2017-05-23 RX ORDER — HYDROMORPHONE HYDROCHLORIDE 2 MG/ML
0.5 INJECTION INTRAMUSCULAR; INTRAVENOUS; SUBCUTANEOUS
Qty: 0 | Refills: 0 | Status: DISCONTINUED | OUTPATIENT
Start: 2017-05-23 | End: 2017-05-23

## 2017-05-23 RX ORDER — HYDROMORPHONE HYDROCHLORIDE 2 MG/ML
1 INJECTION INTRAMUSCULAR; INTRAVENOUS; SUBCUTANEOUS EVERY 4 HOURS
Qty: 0 | Refills: 0 | Status: DISCONTINUED | OUTPATIENT
Start: 2017-05-23 | End: 2017-05-24

## 2017-05-23 RX ORDER — VENLAFAXINE HCL 75 MG
225 CAPSULE, EXT RELEASE 24 HR ORAL DAILY
Qty: 0 | Refills: 0 | Status: DISCONTINUED | OUTPATIENT
Start: 2017-05-23 | End: 2017-05-29

## 2017-05-23 RX ORDER — SODIUM CHLORIDE 9 MG/ML
1000 INJECTION INTRAMUSCULAR; INTRAVENOUS; SUBCUTANEOUS
Qty: 0 | Refills: 0 | Status: DISCONTINUED | OUTPATIENT
Start: 2017-05-23 | End: 2017-05-23

## 2017-05-23 RX ORDER — VANCOMYCIN HCL 1 G
1250 VIAL (EA) INTRAVENOUS EVERY 12 HOURS
Qty: 0 | Refills: 0 | Status: DISCONTINUED | OUTPATIENT
Start: 2017-05-23 | End: 2017-05-23

## 2017-05-23 RX ORDER — VANCOMYCIN HCL 1 G
1250 VIAL (EA) INTRAVENOUS EVERY 8 HOURS
Qty: 0 | Refills: 0 | Status: DISCONTINUED | OUTPATIENT
Start: 2017-05-23 | End: 2017-05-24

## 2017-05-23 RX ORDER — FENTANYL CITRATE 50 UG/ML
50 INJECTION INTRAVENOUS
Qty: 0 | Refills: 0 | Status: DISCONTINUED | OUTPATIENT
Start: 2017-05-23 | End: 2017-05-23

## 2017-05-23 RX ORDER — CEFAZOLIN SODIUM 1 G
2000 VIAL (EA) INJECTION EVERY 8 HOURS
Qty: 0 | Refills: 0 | Status: DISCONTINUED | OUTPATIENT
Start: 2017-05-23 | End: 2017-05-26

## 2017-05-23 RX ORDER — DEXAMETHASONE 0.5 MG/5ML
8 ELIXIR ORAL ONCE
Qty: 0 | Refills: 0 | Status: DISCONTINUED | OUTPATIENT
Start: 2017-05-23 | End: 2017-05-23

## 2017-05-23 RX ORDER — DEXTROAMPHETAMINE SACCHARATE, AMPHETAMINE ASPARTATE, DEXTROAMPHETAMINE SULFATE AND AMPHETAMINE SULFATE 1.875; 1.875; 1.875; 1.875 MG/1; MG/1; MG/1; MG/1
20 TABLET ORAL DAILY
Qty: 0 | Refills: 0 | Status: DISCONTINUED | OUTPATIENT
Start: 2017-05-23 | End: 2017-05-29

## 2017-05-23 RX ORDER — ONDANSETRON 8 MG/1
4 TABLET, FILM COATED ORAL ONCE
Qty: 0 | Refills: 0 | Status: DISCONTINUED | OUTPATIENT
Start: 2017-05-23 | End: 2017-05-23

## 2017-05-23 RX ORDER — HYDROMORPHONE HYDROCHLORIDE 2 MG/ML
0.5 INJECTION INTRAMUSCULAR; INTRAVENOUS; SUBCUTANEOUS EVERY 8 HOURS
Qty: 0 | Refills: 0 | Status: DISCONTINUED | OUTPATIENT
Start: 2017-05-23 | End: 2017-05-23

## 2017-05-23 RX ORDER — ENOXAPARIN SODIUM 100 MG/ML
30 INJECTION SUBCUTANEOUS EVERY 12 HOURS
Qty: 0 | Refills: 0 | Status: DISCONTINUED | OUTPATIENT
Start: 2017-05-24 | End: 2017-05-29

## 2017-05-23 RX ORDER — NICOTINE POLACRILEX 2 MG
1 GUM BUCCAL DAILY
Qty: 0 | Refills: 0 | Status: DISCONTINUED | OUTPATIENT
Start: 2017-05-23 | End: 2017-05-29

## 2017-05-23 RX ORDER — SODIUM CHLORIDE 9 MG/ML
1000 INJECTION, SOLUTION INTRAVENOUS
Qty: 0 | Refills: 0 | Status: DISCONTINUED | OUTPATIENT
Start: 2017-05-23 | End: 2017-05-24

## 2017-05-23 RX ORDER — VANCOMYCIN HCL 1 G
VIAL (EA) INTRAVENOUS
Qty: 0 | Refills: 0 | Status: DISCONTINUED | OUTPATIENT
Start: 2017-05-23 | End: 2017-05-23

## 2017-05-23 RX ADMIN — HYDROMORPHONE HYDROCHLORIDE 1 MILLIGRAM(S): 2 INJECTION INTRAMUSCULAR; INTRAVENOUS; SUBCUTANEOUS at 15:09

## 2017-05-23 RX ADMIN — HYDROMORPHONE HYDROCHLORIDE 0.5 MILLIGRAM(S): 2 INJECTION INTRAMUSCULAR; INTRAVENOUS; SUBCUTANEOUS at 12:25

## 2017-05-23 RX ADMIN — Medication 100 MILLIGRAM(S): at 17:51

## 2017-05-23 RX ADMIN — DEXTROAMPHETAMINE SACCHARATE, AMPHETAMINE ASPARTATE, DEXTROAMPHETAMINE SULFATE AND AMPHETAMINE SULFATE 20 MILLIGRAM(S): 1.875; 1.875; 1.875; 1.875 TABLET ORAL at 17:51

## 2017-05-23 RX ADMIN — Medication 1 PATCH: at 13:00

## 2017-05-23 RX ADMIN — HYDROMORPHONE HYDROCHLORIDE 0.5 MILLIGRAM(S): 2 INJECTION INTRAMUSCULAR; INTRAVENOUS; SUBCUTANEOUS at 12:55

## 2017-05-23 RX ADMIN — Medication 100 MILLIGRAM(S): at 01:24

## 2017-05-23 RX ADMIN — Medication 166.67 MILLIGRAM(S): at 00:43

## 2017-05-23 RX ADMIN — HYDROMORPHONE HYDROCHLORIDE 0.5 MILLIGRAM(S): 2 INJECTION INTRAMUSCULAR; INTRAVENOUS; SUBCUTANEOUS at 13:10

## 2017-05-23 RX ADMIN — Medication 225 MILLIGRAM(S): at 14:54

## 2017-05-23 RX ADMIN — HYDROMORPHONE HYDROCHLORIDE 0.5 MILLIGRAM(S): 2 INJECTION INTRAMUSCULAR; INTRAVENOUS; SUBCUTANEOUS at 12:35

## 2017-05-23 RX ADMIN — Medication 1 PATCH: at 14:54

## 2017-05-23 RX ADMIN — Medication 166.67 MILLIGRAM(S): at 09:05

## 2017-05-23 RX ADMIN — ARIPIPRAZOLE 5 MILLIGRAM(S): 15 TABLET ORAL at 14:54

## 2017-05-23 RX ADMIN — HYDROMORPHONE HYDROCHLORIDE 1 MILLIGRAM(S): 2 INJECTION INTRAMUSCULAR; INTRAVENOUS; SUBCUTANEOUS at 14:54

## 2017-05-23 RX ADMIN — HYDROMORPHONE HYDROCHLORIDE 0.5 MILLIGRAM(S): 2 INJECTION INTRAMUSCULAR; INTRAVENOUS; SUBCUTANEOUS at 12:50

## 2017-05-23 RX ADMIN — Medication 166.67 MILLIGRAM(S): at 17:51

## 2017-05-23 RX ADMIN — Medication 100 MILLIGRAM(S): at 10:10

## 2017-05-23 NOTE — PROGRESS NOTE ADULT - SUBJECTIVE AND OBJECTIVE BOX
DARREL ALEGRIA is a 26y Female with HPI:  26 y.o Female with PMH s/p R ankle fx and s/p ORIF right tib/fib May 2016 (had a plate and 9 screws placed) with recent hardware removal 2 weeks ago (some hardware still remains in place) on May 1st, 2017 by orthopedic Sx Dr. Sims in [place], thereafter on Monday the patient had sutures removed and then Tuesday flew to [place]. Pt has noted RLE erythema, swelling and drainage from the incision site x 2 days.    BLOOD CX NEG   WOUND X STREP AND STAPH   FOR OR TODAY      Allergies:  No Known Allergies      Medications:  ceFAZolin   IVPB  IV Intermittent   ceFAZolin   IVPB 2000milliGRAM(s) IV Intermittent every 8 hours  vancomycin  IVPB  IV Intermittent   vancomycin  IVPB 1250milliGRAM(s) IV Intermittent every 8 hours  acetaminophen   Tablet 650milliGRAM(s) Oral every 6 hours PRN  oxyCODONE  5 mG/acetaminophen 325 mG 2Tablet(s) Oral every 6 hours PRN  HYDROmorphone   Tablet 0.5milliGRAM(s) Oral every 8 hours PRN  nicotine -   7 mG/24Hr(s) Patch 1patch Transdermal daily  ARIPiprazole 5milliGRAM(s) Oral daily  amphetamine/dextroamphetamine 20milliGRAM(s) Oral daily  venlafaxine XR. 225milliGRAM(s) Oral daily      ANTIBIOTICS: KEFZOL AND VANCO        Review of Systems: - Negative except as mentioned above     Physical Exam:  ICU Vital Signs Last 24 Hrs  T(C): 37, Max: 37 (05-22 @ 15:33)  T(F): 98.6, Max: 98.6 (05-22 @ 15:33)  HR: 70 (61 - 90)  BP: 117/70 (95/55 - 119/71)  BP(mean): --  ABP: --  ABP(mean): --  RR: 18 (18 - 18)  SpO2: 96% (96% - 100%)    GEN: NAD, pleasant  HEENT: normocephalic and atraumatic. EOMI. BERNARDO...  NECK: Supple. No carotid bruits.  No lymphadenopathy or thyromegaly.  LUNGS: Clear to auscultation.  HEART: Regular rate and rhythm without murmur.  ABDOMEN: Soft, nontender, and nondistended.  Positive bowel sounds.  No hepatosplenomegaly was noted.  NO REBOUND NO GUARDING  EXTREMITIES:RIGHT LEG WRAPPED   NEUROLOGIC: Cranial nerves II through XII are grossly intact.    SKIN: No ulceration or induration present.      Labs:  05-22    141  |  105  |  3.0<L>  ----------------------------<  89  4.0   |  27.0  |  0.50    Ca    8.7      22 May 2017 07:25    TPro  6.4<L>  /  Alb  3.3  /  TBili  0.3<L>  /  DBili  x   /  AST  13  /  ALT  10  /  AlkPhos  62  05-21                          10.9   8.0   )-----------( 280      ( 22 May 2017 07:25 )             33.1           LIVER FUNCTIONS - ( 21 May 2017 09:46 )  Alb: 3.3 g/dL / Pro: 6.4 g/dL / ALK PHOS: 62 U/L / ALT: 10 U/L / AST: 13 U/L / GGT: x               CAPILLARY BLOOD GLUCOSE

## 2017-05-23 NOTE — PROGRESS NOTE ADULT - SUBJECTIVE AND OBJECTIVE BOX
Patient is a 26y old  Female who presents with a chief complaint of RLE swelling, redness and discharge (20 May 2017 16:39)      HEALTH ISSUES - PROBLEM Dx:  Prophylactic measure: Prophylactic measure  Smoker: Smoker  Depression: Depression  Cellulitis of right ankle: Cellulitis of right ankle          INTERVAL HPI/OVERNIGHT EVENTS:    Vital Signs Last 24 Hrs  T(C): 37.1, Max: 37.1 (05-23 @ 12:25)  T(F): 98.8, Max: 98.8 (05-23 @ 12:25)  HR: 79 (69 - 94)  BP: 123/70 (100/60 - 124/72)  BP(mean): --  RR: 14 (12 - 18)  SpO2: 98% (96% - 100%)    CAPILLARY BLOOD GLUCOSE      I&O's Summary      CONSTITUTIONAL: Well appearing, well nourished, awake, alert and in no apparent distress  ENMT: Airway patent, Nasal mucosa clear. Mouth with normal mucosa. Throat has no vesicles, no oropharyngeal exudates and uvula is midline.  EYES: Clear bilaterally, pupils equal, round and reactive to light. EOMI.  CARDIAC: Normal rate, regular rhythm.  Heart sounds S1, S2.  No murmurs, rubs or gallops   RESPIRATORY: Breath sounds clear and equal bilaterally. No wheezes, rhales or rhonchi  MUSCULOSKELETAL: Spine appears normal, range of motion is not limited, no muscle or joint tenderness  EXTREMITIES: No edema, cyanosis or deformity   NEUROLOGICAL: Alert and oriented, no focal deficits, no motor or sensory deficits.  SKIN: No rash, skin turgor     MEDICATIONS  (STANDING):  nicotine -   7 mG/24Hr(s) Patch 1patch Transdermal daily  ARIPiprazole 5milliGRAM(s) Oral daily  amphetamine/dextroamphetamine 20milliGRAM(s) Oral daily  venlafaxine XR. 225milliGRAM(s) Oral daily  ceFAZolin   IVPB 2000milliGRAM(s) IV Intermittent every 8 hours  lactated ringers. 1000milliLiter(s) IV Continuous <Continuous>  vancomycin  IVPB 1250milliGRAM(s) IV Intermittent every 8 hours    MEDICATIONS  (PRN):  acetaminophen   Tablet 650milliGRAM(s) Oral every 6 hours PRN For Temp greater than 38 C (100.4 F) or mild pain 1-3  oxyCODONE  5 mG/acetaminophen 325 mG 2Tablet(s) Oral every 6 hours PRN Moderate Pain (4 - 6)  HYDROmorphone  Injectable 1milliGRAM(s) IV Push every 6 hours PRN Severe Pain (7 - 10)      LABS:                        11.3   7.2   )-----------( 333      ( 23 May 2017 08:00 )             33.7     05-23    140  |  100  |  5.0<L>  ----------------------------<  91  4.2   |  30.0<H>  |  0.57    Ca    9.2      23 May 2017 08:00              RADIOLOGY & ADDITIONAL TESTS:

## 2017-05-23 NOTE — BRIEF OPERATIVE NOTE - COMMENTS
post-op plan: WBAT, elevate RLE, PT/OT, f/u cultures, ID consult f/u, likely PICC line, plan return to [place] with possible additional surgery for remaining implant removal.

## 2017-05-23 NOTE — PROGRESS NOTE ADULT - PROBLEM SELECTOR PLAN 1
-Pt remains afebrile  -leukocytosis wnl  -elevated esr/crp   -Ortho f/u noted and appreciated and brief operative report noted. Pt s/p sx intervention I&D wound closure using advancement flap-  local advacement flap for wound dehiscence.     -Wound cx thus far shows strep pyogenes A   -Bcx show no growth   -c/w cefazolin 2G IVPB Q8hrs   -c/w vancomycin 1250mg IVPB q8hrs     -D#4 of abx   -D/w Dr. Johnston the current plan of care for now will c/w IV abx   x ray shows questionable deep subcutaneous soft tissue gas is noted at the lateral distal tibia there is overlying soft tissue swelling. 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

## 2017-05-23 NOTE — BRIEF OPERATIVE NOTE - PRE-OP DX
Abscess  05/23/2017  tibia  Active  Shalom Parker  Wound infection after surgery, initial encounter  05/23/2017  previous removal of implants at outside insitution  Active  Shalom Parker

## 2017-05-23 NOTE — PROGRESS NOTE ADULT - ASSESSMENT
26 y.o Female with PMH s/p R ankle fx and s/p ORIF right tib/fib May 2016 (had a plate and 9 screws placed) with recent hardware removal 2 weeks ago (some hardware still remains in place) on May 1st, 2017 by orthopedic Sx Dr. Sims in [place], thereafter on Monday the patient had sutures removed and then Tuesday flew to [place]. Pt has noted RLE erythema, swelling and drainage from the incision site x 2 days. Patient also reports subjective fever/chills x 1 day.  BLOOD CX PENDING WOUND   STAPH STREP  FOR OR   WILL FOLLOW UP

## 2017-05-23 NOTE — PROGRESS NOTE ADULT - ASSESSMENT
26 y.o. F w/ PMH depression, s/p R ankle fx and s/p ORIF right tib/fib May 2016 (had a plate and 9 screws placed) with recent hardware removal 2 weeks ago (some hardware still remains in place) on May 1st, 2017 and suture removal on 5/15/17, presents with RLE swelling, erythema and discharge admitted w/ left medial ankle surgical wound dehiscence. S/p I&d today by Ortho.

## 2017-05-23 NOTE — BRIEF OPERATIVE NOTE - PROCEDURE
Wound closure using advancement flap  05/23/2017  local advacement flap for wound dehsicence  Active  MLINN  Debridement  05/23/2017  right lower extremity wound and seperate abcess  Active  MLINN

## 2017-05-24 LAB
ANION GAP SERPL CALC-SCNC: 9 MMOL/L — SIGNIFICANT CHANGE UP (ref 5–17)
ANISOCYTOSIS BLD QL: SLIGHT — SIGNIFICANT CHANGE UP
BUN SERPL-MCNC: 3 MG/DL — LOW (ref 8–20)
CALCIUM SERPL-MCNC: 8.4 MG/DL — LOW (ref 8.6–10.2)
CHLORIDE SERPL-SCNC: 102 MMOL/L — SIGNIFICANT CHANGE UP (ref 98–107)
CO2 SERPL-SCNC: 29 MMOL/L — SIGNIFICANT CHANGE UP (ref 22–29)
CREAT SERPL-MCNC: 0.55 MG/DL — SIGNIFICANT CHANGE UP (ref 0.5–1.3)
EOSINOPHIL NFR BLD AUTO: 3 % — SIGNIFICANT CHANGE UP (ref 0–5)
GLUCOSE SERPL-MCNC: 95 MG/DL — SIGNIFICANT CHANGE UP (ref 70–115)
HCT VFR BLD CALC: 30.8 % — LOW (ref 37–47)
HGB BLD-MCNC: 10.1 G/DL — LOW (ref 12–16)
HYPOCHROMIA BLD QL: SLIGHT — SIGNIFICANT CHANGE UP
LYMPHOCYTES # BLD AUTO: 28 % — SIGNIFICANT CHANGE UP (ref 20–55)
MACROCYTES BLD QL: SLIGHT — SIGNIFICANT CHANGE UP
MCHC RBC-ENTMCNC: 32.6 PG — HIGH (ref 27–31)
MCHC RBC-ENTMCNC: 32.8 G/DL — SIGNIFICANT CHANGE UP (ref 32–36)
MCV RBC AUTO: 99.4 FL — HIGH (ref 81–99)
MICROCYTES BLD QL: SLIGHT — SIGNIFICANT CHANGE UP
MONOCYTES NFR BLD AUTO: 10 % — SIGNIFICANT CHANGE UP (ref 3–10)
NEUTROPHILS NFR BLD AUTO: 56 % — SIGNIFICANT CHANGE UP (ref 37–73)
OVALOCYTES BLD QL SMEAR: SLIGHT — SIGNIFICANT CHANGE UP
PLAT MORPH BLD: NORMAL — SIGNIFICANT CHANGE UP
PLATELET # BLD AUTO: 337 K/UL — SIGNIFICANT CHANGE UP (ref 150–400)
POIKILOCYTOSIS BLD QL AUTO: SLIGHT — SIGNIFICANT CHANGE UP
POTASSIUM SERPL-MCNC: 4.2 MMOL/L — SIGNIFICANT CHANGE UP (ref 3.5–5.3)
POTASSIUM SERPL-SCNC: 4.2 MMOL/L — SIGNIFICANT CHANGE UP (ref 3.5–5.3)
RBC # BLD: 3.1 M/UL — LOW (ref 4.4–5.2)
RBC # FLD: 12.7 % — SIGNIFICANT CHANGE UP (ref 11–15.6)
RBC BLD AUTO: ABNORMAL
SODIUM SERPL-SCNC: 140 MMOL/L — SIGNIFICANT CHANGE UP (ref 135–145)
VARIANT LYMPHS # BLD: 3 % — SIGNIFICANT CHANGE UP (ref 0–6)
WBC # BLD: 8.3 K/UL — SIGNIFICANT CHANGE UP (ref 4.8–10.8)
WBC # FLD AUTO: 8.3 K/UL — SIGNIFICANT CHANGE UP (ref 4.8–10.8)

## 2017-05-24 PROCEDURE — 99233 SBSQ HOSP IP/OBS HIGH 50: CPT

## 2017-05-24 RX ORDER — OXYCODONE HYDROCHLORIDE 5 MG/1
5 TABLET ORAL EVERY 6 HOURS
Qty: 0 | Refills: 0 | Status: DISCONTINUED | OUTPATIENT
Start: 2017-05-24 | End: 2017-05-29

## 2017-05-24 RX ORDER — OXYCODONE HYDROCHLORIDE 5 MG/1
10 TABLET ORAL EVERY 6 HOURS
Qty: 0 | Refills: 0 | Status: DISCONTINUED | OUTPATIENT
Start: 2017-05-24 | End: 2017-05-29

## 2017-05-24 RX ADMIN — Medication 225 MILLIGRAM(S): at 11:47

## 2017-05-24 RX ADMIN — Medication 100 MILLIGRAM(S): at 18:01

## 2017-05-24 RX ADMIN — Medication 166.67 MILLIGRAM(S): at 02:17

## 2017-05-24 RX ADMIN — Medication 1 PATCH: at 11:48

## 2017-05-24 RX ADMIN — OXYCODONE HYDROCHLORIDE 10 MILLIGRAM(S): 5 TABLET ORAL at 18:36

## 2017-05-24 RX ADMIN — HYDROMORPHONE HYDROCHLORIDE 1 MILLIGRAM(S): 2 INJECTION INTRAMUSCULAR; INTRAVENOUS; SUBCUTANEOUS at 00:48

## 2017-05-24 RX ADMIN — OXYCODONE HYDROCHLORIDE 10 MILLIGRAM(S): 5 TABLET ORAL at 12:30

## 2017-05-24 RX ADMIN — SODIUM CHLORIDE 85 MILLILITER(S): 9 INJECTION, SOLUTION INTRAVENOUS at 06:29

## 2017-05-24 RX ADMIN — ARIPIPRAZOLE 5 MILLIGRAM(S): 15 TABLET ORAL at 11:48

## 2017-05-24 RX ADMIN — HYDROMORPHONE HYDROCHLORIDE 1 MILLIGRAM(S): 2 INJECTION INTRAMUSCULAR; INTRAVENOUS; SUBCUTANEOUS at 01:20

## 2017-05-24 RX ADMIN — OXYCODONE HYDROCHLORIDE 10 MILLIGRAM(S): 5 TABLET ORAL at 18:01

## 2017-05-24 RX ADMIN — DEXTROAMPHETAMINE SACCHARATE, AMPHETAMINE ASPARTATE, DEXTROAMPHETAMINE SULFATE AND AMPHETAMINE SULFATE 20 MILLIGRAM(S): 1.875; 1.875; 1.875; 1.875 TABLET ORAL at 11:47

## 2017-05-24 RX ADMIN — ENOXAPARIN SODIUM 30 MILLIGRAM(S): 100 INJECTION SUBCUTANEOUS at 18:01

## 2017-05-24 RX ADMIN — ENOXAPARIN SODIUM 30 MILLIGRAM(S): 100 INJECTION SUBCUTANEOUS at 06:18

## 2017-05-24 RX ADMIN — Medication 100 MILLIGRAM(S): at 02:19

## 2017-05-24 RX ADMIN — OXYCODONE HYDROCHLORIDE 10 MILLIGRAM(S): 5 TABLET ORAL at 11:48

## 2017-05-24 RX ADMIN — Medication 166.67 MILLIGRAM(S): at 10:03

## 2017-05-24 RX ADMIN — Medication 1 PATCH: at 13:00

## 2017-05-24 RX ADMIN — Medication 100 MILLIGRAM(S): at 10:03

## 2017-05-24 NOTE — PROGRESS NOTE ADULT - ASSESSMENT
26 y.o. F w/ PMH depression, s/p R ankle fx and s/p ORIF right tib/fib May 2016 (had a plate and 9 screws placed) with recent hardware removal 2 weeks ago (some hardware still remains in place) on May 1st, 2017 and suture removal on 5/15/17, presents with RLE swelling, erythema and discharge admitted w/ left medial ankle surgical wound dehiscence. S/p I&D 5/23/17, pending final wound cx.

## 2017-05-24 NOTE — PROGRESS NOTE ADULT - SUBJECTIVE AND OBJECTIVE BOX
pt pod 1 sp I&D right ankle under GETA. Tolerated well. Denies any A/c.   pt with no n/v @ this time. using pain meds as ordered/needed with some pain relief. vss. spont vent. no issues with anesthesia at this time. pt resting comfortably @ this time.

## 2017-05-24 NOTE — PROGRESS NOTE ADULT - SUBJECTIVE AND OBJECTIVE BOX
s/p right ankle surgical incision I/D pod #1.  ER cultures +staph OR cultures pending.  Pt on IV abx per ID.  c/o pain last night, now better.     Vital Signs Last 24 Hrs  T(C): 36.7, Max: 37.1 (05-23 @ 12:25)  T(F): 98, Max: 98.8 (05-23 @ 12:25)  HR: 67 (67 - 94)  BP: 106/62 (106/62 - 124/84)  BP(mean): --  RR: 20 (12 - 20)  SpO2: 97% (97% - 100%)                          10.1   8.3   )-----------( 337      ( 24 May 2017 08:08 )             30.8   05-24    140  |  102  |  3.0<L>  ----------------------------<  95  4.2   |  29.0  |  0.55    Ca    8.4<L>      24 May 2017 08:08    Pt lying in bed NAD comfortable, eating breakfast  Right lower extremity bandages in place c/d/i  +hemovac with approx 15cc in canister   toe movement intact  sensation intact  cap refill brisk    a/p:    Pt with infected surgical wound right lower extremity s/p I/D pod #1  continue IV abx per ID  follow OR cultures

## 2017-05-24 NOTE — PHYSICAL THERAPY INITIAL EVALUATION ADULT - RANGE OF MOTION EXAMINATION, REHAB EVAL
no ROM deficits were identified/difficulty moving the right ankle due to pain/surgery/deficits as listed below

## 2017-05-24 NOTE — PHYSICAL THERAPY INITIAL EVALUATION ADULT - ADDITIONAL COMMENTS
Pt. reports that she can stay with her friend for a while if she needs help, and her friend's place doesn't have any steps.

## 2017-05-24 NOTE — PHYSICAL THERAPY INITIAL EVALUATION ADULT - MANUAL MUSCLE TESTING RESULTS, REHAB EVAL
with the exception of the right ankle 2+/5 due to sx.; limited by pain/no strength deficits were identified

## 2017-05-24 NOTE — PROGRESS NOTE ADULT - SUBJECTIVE AND OBJECTIVE BOX
Patient is a 26y old  Female who presents with a chief complaint of RLE swelling, redness and discharge (20 May 2017 16:39)      HEALTH ISSUES - PROBLEM Dx:  Prophylactic measure: Prophylactic measure  Smoker: Smoker  Depression: Depression  Cellulitis of right ankle: Cellulitis of right ankle      INTERVAL HPI/OVERNIGHT EVENTS:  Patient seen and examined at bedside. No acute events overnight. Patient states she is feeling well. Patient states that pain could be better controlled. Currently pain is 6/10. Patient denies chest pain, SOB, abd pain, N/V, fever, chills, dysuria or any other complaints. All remainder ROS negative.     Vital Signs Last 24 Hrs  T(C): 36.7, Max: 37.1 (05-24 @ 00:51)  T(F): 98, Max: 98.8 (05-24 @ 00:51)  HR: 67 (67 - 94)  BP: 106/62 (106/62 - 124/84)  BP(mean): --  RR: 20 (14 - 20)  SpO2: 97% (97% - 100%)      I&O's Summary    I & Os for current day (as of 24 May 2017 12:39)  =============================================  IN: 1000 ml / OUT: 0 ml / NET: 1000 ml    CONSTITUTIONAL: Well appearing, well nourished, awake, alert and in no apparent distress  CARDIAC: Normal rate, regular rhythm.  Heart sounds S1, S2.  No murmurs, rubs or gallops   RESPIRATORY: Breath sounds clear and equal bilaterally. No wheezes, rhales or rhonchi  GASTROENTEROLOGY: Soft, NT ND BS + normoactive   EXTREMITIES: No edema, cyanosis or deformity, RLE w/ ace wrap C/D/I   NEUROLOGICAL: Alert and oriented, no focal deficits, no motor or sensory deficits.  SKIN: No rash, skin turgor wnl    MEDICATIONS  (STANDING):  nicotine -   7 mG/24Hr(s) Patch 1patch Transdermal daily  ARIPiprazole 5milliGRAM(s) Oral daily  amphetamine/dextroamphetamine 20milliGRAM(s) Oral daily  venlafaxine XR. 225milliGRAM(s) Oral daily  ceFAZolin   IVPB 2000milliGRAM(s) IV Intermittent every 8 hours  enoxaparin Injectable 30milliGRAM(s) SubCutaneous every 12 hours    MEDICATIONS  (PRN):  acetaminophen   Tablet 650milliGRAM(s) Oral every 6 hours PRN For Temp greater than 38 C (100.4 F) or mild pain 1-3  oxyCODONE IR 5milliGRAM(s) Oral every 6 hours PRN Moderate Pain (4 - 6)  oxyCODONE IR 10milliGRAM(s) Oral every 6 hours PRN Severe Pain (7 - 10)  oxyCODONE  5 mG/acetaminophen 325 mG 1Tablet(s) Oral every 6 hours PRN breakthrough pain      LABS:                        10.1   8.3   )-----------( 337      ( 24 May 2017 08:08 )             30.8     05-24    140  |  102  |  3.0<L>  ----------------------------<  95  4.2   |  29.0  |  0.55    Ca    8.4<L>      24 May 2017 08:08      RADIOLOGY & ADDITIONAL TESTS:  No new imaging studies

## 2017-05-24 NOTE — PROGRESS NOTE ADULT - PROBLEM SELECTOR PLAN 1
-Pt remains afebrile  -leukocytosis wnl  -elevated esr/crp   -Ortho f/u noted and appreciated   -s/p I&D wound closure using advancement flap for wound dehiscence on 5/23/17     -Initial tissue cx shows staph/strep and surgical wound cx pending   -Bcx show no growth   -c/w cefazolin 2G IVPB Q8hrs D#5  -D/w ID Dr. Cruz and discontinued vancomycin 1250mg IVPB q8hrs     x ray shows questionable deep subcutaneous soft tissue gas is noted at the lateral distal tibia there is overlying soft tissue swelling. 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  -D/c dilaudid IV prn and percoset changed pt to oxycodone IR 5-10mg q6hrs depending on severity of pain for severe pain     -c/w percoset prn for breakthrough pain   -c/w tylenol for mild pain

## 2017-05-25 LAB
ANION GAP SERPL CALC-SCNC: 13 MMOL/L — SIGNIFICANT CHANGE UP (ref 5–17)
BASOPHILS # BLD AUTO: 0 K/UL — SIGNIFICANT CHANGE UP (ref 0–0.2)
BASOPHILS NFR BLD AUTO: 0.6 % — SIGNIFICANT CHANGE UP (ref 0–2)
BUN SERPL-MCNC: 5 MG/DL — LOW (ref 8–20)
CALCIUM SERPL-MCNC: 9.4 MG/DL — SIGNIFICANT CHANGE UP (ref 8.6–10.2)
CHLORIDE SERPL-SCNC: 101 MMOL/L — SIGNIFICANT CHANGE UP (ref 98–107)
CO2 SERPL-SCNC: 28 MMOL/L — SIGNIFICANT CHANGE UP (ref 22–29)
CREAT SERPL-MCNC: 0.61 MG/DL — SIGNIFICANT CHANGE UP (ref 0.5–1.3)
CULTURE RESULTS: SIGNIFICANT CHANGE UP
CULTURE RESULTS: SIGNIFICANT CHANGE UP
EOSINOPHIL # BLD AUTO: 0.2 K/UL — SIGNIFICANT CHANGE UP (ref 0–0.5)
EOSINOPHIL NFR BLD AUTO: 2.5 % — SIGNIFICANT CHANGE UP (ref 0–6)
GLUCOSE SERPL-MCNC: 101 MG/DL — SIGNIFICANT CHANGE UP (ref 70–115)
HCT VFR BLD CALC: 34.9 % — LOW (ref 37–47)
HGB BLD-MCNC: 11.3 G/DL — LOW (ref 12–16)
LYMPHOCYTES # BLD AUTO: 2 K/UL — SIGNIFICANT CHANGE UP (ref 1–4.8)
LYMPHOCYTES # BLD AUTO: 30.4 % — SIGNIFICANT CHANGE UP (ref 20–55)
MCHC RBC-ENTMCNC: 32.4 G/DL — SIGNIFICANT CHANGE UP (ref 32–36)
MCHC RBC-ENTMCNC: 32.6 PG — HIGH (ref 27–31)
MCV RBC AUTO: 100.6 FL — HIGH (ref 81–99)
MONOCYTES # BLD AUTO: 0.6 K/UL — SIGNIFICANT CHANGE UP (ref 0–0.8)
MONOCYTES NFR BLD AUTO: 8.8 % — SIGNIFICANT CHANGE UP (ref 3–10)
NEUTROPHILS # BLD AUTO: 3.7 K/UL — SIGNIFICANT CHANGE UP (ref 1.8–8)
NEUTROPHILS NFR BLD AUTO: 56.9 % — SIGNIFICANT CHANGE UP (ref 37–73)
PLATELET # BLD AUTO: 366 K/UL — SIGNIFICANT CHANGE UP (ref 150–400)
POTASSIUM SERPL-MCNC: 4.4 MMOL/L — SIGNIFICANT CHANGE UP (ref 3.5–5.3)
POTASSIUM SERPL-SCNC: 4.4 MMOL/L — SIGNIFICANT CHANGE UP (ref 3.5–5.3)
RBC # BLD: 3.47 M/UL — LOW (ref 4.4–5.2)
RBC # FLD: 12.4 % — SIGNIFICANT CHANGE UP (ref 11–15.6)
SODIUM SERPL-SCNC: 142 MMOL/L — SIGNIFICANT CHANGE UP (ref 135–145)
SPECIMEN SOURCE: SIGNIFICANT CHANGE UP
SPECIMEN SOURCE: SIGNIFICANT CHANGE UP
WBC # BLD: 6.5 K/UL — SIGNIFICANT CHANGE UP (ref 4.8–10.8)
WBC # FLD AUTO: 6.5 K/UL — SIGNIFICANT CHANGE UP (ref 4.8–10.8)

## 2017-05-25 PROCEDURE — 99232 SBSQ HOSP IP/OBS MODERATE 35: CPT

## 2017-05-25 PROCEDURE — 99233 SBSQ HOSP IP/OBS HIGH 50: CPT

## 2017-05-25 RX ORDER — SENNA PLUS 8.6 MG/1
2 TABLET ORAL AT BEDTIME
Qty: 0 | Refills: 0 | Status: DISCONTINUED | OUTPATIENT
Start: 2017-05-25 | End: 2017-05-29

## 2017-05-25 RX ORDER — DOCUSATE SODIUM 100 MG
100 CAPSULE ORAL
Qty: 0 | Refills: 0 | Status: DISCONTINUED | OUTPATIENT
Start: 2017-05-25 | End: 2017-05-29

## 2017-05-25 RX ADMIN — ARIPIPRAZOLE 5 MILLIGRAM(S): 15 TABLET ORAL at 12:25

## 2017-05-25 RX ADMIN — Medication 225 MILLIGRAM(S): at 12:25

## 2017-05-25 RX ADMIN — OXYCODONE HYDROCHLORIDE 10 MILLIGRAM(S): 5 TABLET ORAL at 19:26

## 2017-05-25 RX ADMIN — Medication 100 MILLIGRAM(S): at 17:52

## 2017-05-25 RX ADMIN — Medication 1 PATCH: at 12:25

## 2017-05-25 RX ADMIN — Medication 1 PATCH: at 12:39

## 2017-05-25 RX ADMIN — OXYCODONE HYDROCHLORIDE 10 MILLIGRAM(S): 5 TABLET ORAL at 13:40

## 2017-05-25 RX ADMIN — ENOXAPARIN SODIUM 30 MILLIGRAM(S): 100 INJECTION SUBCUTANEOUS at 06:26

## 2017-05-25 RX ADMIN — DEXTROAMPHETAMINE SACCHARATE, AMPHETAMINE ASPARTATE, DEXTROAMPHETAMINE SULFATE AND AMPHETAMINE SULFATE 20 MILLIGRAM(S): 1.875; 1.875; 1.875; 1.875 TABLET ORAL at 12:25

## 2017-05-25 RX ADMIN — OXYCODONE HYDROCHLORIDE 10 MILLIGRAM(S): 5 TABLET ORAL at 07:03

## 2017-05-25 RX ADMIN — Medication 100 MILLIGRAM(S): at 12:40

## 2017-05-25 RX ADMIN — Medication 100 MILLIGRAM(S): at 02:55

## 2017-05-25 RX ADMIN — OXYCODONE HYDROCHLORIDE 10 MILLIGRAM(S): 5 TABLET ORAL at 00:38

## 2017-05-25 RX ADMIN — SENNA PLUS 2 TABLET(S): 8.6 TABLET ORAL at 12:40

## 2017-05-25 RX ADMIN — ENOXAPARIN SODIUM 30 MILLIGRAM(S): 100 INJECTION SUBCUTANEOUS at 17:50

## 2017-05-25 RX ADMIN — OXYCODONE HYDROCHLORIDE 10 MILLIGRAM(S): 5 TABLET ORAL at 12:40

## 2017-05-25 RX ADMIN — Medication 100 MILLIGRAM(S): at 12:25

## 2017-05-25 RX ADMIN — OXYCODONE HYDROCHLORIDE 10 MILLIGRAM(S): 5 TABLET ORAL at 00:08

## 2017-05-25 RX ADMIN — OXYCODONE HYDROCHLORIDE 10 MILLIGRAM(S): 5 TABLET ORAL at 06:33

## 2017-05-25 NOTE — PROGRESS NOTE ADULT - PROBLEM SELECTOR PLAN 1
-Pt remains afebrile  -leukocytosis wnl  -elevated esr/crp   -Ortho f/u noted and appreciated   -s/p I&D wound closure using advancement flap for wound dehiscence on 5/23/17     -Initial tissue cx shows staph/strep and surgical wound shows the same bacteria   -Bcx show no growth   -c/w cefazolin 2G IVPB Q8hrs D#6  -D/w ID Dr. Cruz and will have PICC line placed for long term abx   -c/w oxycodone IR 5-10mg q6hrs depending on severity of pain for severe pain     -c/w percoset prn for breakthrough pain   -c/w tylenol for mild pain

## 2017-05-25 NOTE — PROGRESS NOTE ADULT - ASSESSMENT
26 y.o Female with PMH s/p R ankle fx and s/p ORIF right tib/fib May 2016 (had a plate and 9 screws placed) with recent hardware removal 2 weeks ago (some hardware still remains in place) on May 1st, 2017 by orthopedic Sx Dr. Sims in [place], thereafter on Monday the patient had sutures removed and then Tuesday flew to [place]. Pt has noted RLE erythema, swelling and drainage from the incision site x 2 days. Patient also reports subjective fever/chills x 1 day.  BLOOD CX NEGATIVE    STAPH STREP  S/POR  PLAN AT [facility] 6 WEEKS IV ABX  MAY NEED TO CONTINUE TREATMENT IN [place]  WITH  CARE  WILL NEED PICC LINE

## 2017-05-25 NOTE — PROGRESS NOTE ADULT - SUBJECTIVE AND OBJECTIVE BOX
DARREL ALEGRIA    [id number]    History: 26y Female is status post right ankle wound I&D POD#2. Patient is doing well and is comfortable. The patient's pain is controlled using the prescribed pain medications. The patient is participating in physical therapy. Denies nausea, vomiting, chest pain, shortness of breath, abdominal pain or fever. No new complaints.                            11.3   6.5   )-----------( 366      ( 25 May 2017 09:36 )             34.9     05-25    142  |  101  |  5.0<L>  ----------------------------<  101  4.4   |  28.0  |  0.61    Ca    9.4      25 May 2017 09:36        MEDICATIONS  (STANDING):  nicotine -   7 mG/24Hr(s) Patch 1patch Transdermal daily  ARIPiprazole 5milliGRAM(s) Oral daily  amphetamine/dextroamphetamine 20milliGRAM(s) Oral daily  venlafaxine XR. 225milliGRAM(s) Oral daily  ceFAZolin   IVPB 2000milliGRAM(s) IV Intermittent every 8 hours  enoxaparin Injectable 30milliGRAM(s) SubCutaneous every 12 hours    MEDICATIONS  (PRN):  acetaminophen   Tablet 650milliGRAM(s) Oral every 6 hours PRN For Temp greater than 38 C (100.4 F) or mild pain 1-3  oxyCODONE IR 5milliGRAM(s) Oral every 6 hours PRN Moderate Pain (4 - 6)  oxyCODONE IR 10milliGRAM(s) Oral every 6 hours PRN Severe Pain (7 - 10)  oxyCODONE  5 mG/acetaminophen 325 mG 1Tablet(s) Oral every 6 hours PRN breakthrough pain  docusate sodium 100milliGRAM(s) Oral two times a day PRN Constipation  senna 2Tablet(s) Oral at bedtime PRN Constipation      Physical exam: The right ankle incisions are clean, dry and intact. No drainage or discharge. No erythema is noted. No blistering. No ecchymosis. Clean, dry gauze applied to incision sites. HV d/jareth and clean/dry dressings applied to drain site. The calf is supple nontender. Passive range of motion is acceptable to due postoperative pain.  Sensation to light touch is grossly intact distally. Extensor hallucis longus and flexor hallucis longus are intact. No foot drop. 2+ dorsalis pedis pulse. Capillary refill is less than 2 seconds. No cyanosis.    Primary Orthopedic Assessment:  • 26y Female is status post right ankle wound I&D POD#2    Secondary  Orthopedic Assessment(s):   •     Secondary  Medical Assessment(s):   •     Plan:   • DVT prophylaxis as prescribed, including use of compression devices and ankle pumps  • Continue physical therapy  • Weightbearing as tolerated of the right lower extremity with assistance of a walker  • Incentive spirometry encouraged  • Pain control as clinically indicated  • abx as per ID  • Discharge planning – anticipated discharge is Home.

## 2017-05-25 NOTE — PROGRESS NOTE ADULT - ASSESSMENT
26 y.o. F w/ PMH depression, s/p R ankle fx and s/p ORIF right tib/fib May 2016 (had a plate and 9 screws placed) with recent hardware removal 2 weeks ago (some hardware still remains in place) on May 1st, 2017 and suture removal on 5/15/17, presents with RLE swelling, erythema and discharge admitted w/ left medial ankle surgical wound dehiscence. S/p I&D 5/23/17, wound cx which shows staph and strep. Pt to have picc line placed today.

## 2017-05-25 NOTE — PROGRESS NOTE ADULT - SUBJECTIVE AND OBJECTIVE BOX
Patient is a 26y old  Female who presents with a chief complaint of RLE swelling, redness and discharge (20 May 2017 16:39)      HEALTH ISSUES - PROBLEM Dx:  Prophylactic measure: Prophylactic measure  Smoker: Smoker  Depression: Depression  Cellulitis of right ankle: Cellulitis of right ankle      INTERVAL HPI/OVERNIGHT EVENTS:  Patient seen and examined at bedside. No acute events overnight. Patient states she is feeling well, pain well controlled on pain medication and pt able to ambulate without any complications with crutches. Patient denies chest pain, SOB, abd pain, N/V, fever, chills, dysuria or any other complaints. All remainder ROS negative.     Vital Signs Last 24 Hrs  T(C): 36.8, Max: 36.9 (05-24 @ 23:22)  T(F): 98.2, Max: 98.5 (05-24 @ 23:22)  HR: 70 (63 - 70)  BP: 122/62 (101/55 - 122/62)  BP(mean): --  RR: 18 (18 - 20)  SpO2: 98% (96% - 98%)      I&O's Summary    I & Os for current day (as of 25 May 2017 18:54)  =============================================  IN: 0 ml / OUT: 7.5 ml / NET: -7.5 ml      CONSTITUTIONAL: Well appearing, well nourished, awake, alert and in no apparent distress  CARDIAC: Normal rate, regular rhythm.  Heart sounds S1, S2.  No murmurs, rubs or gallops   RESPIRATORY: Breath sounds clear and equal bilaterally. No wheezes, rhales or rhonchi  GASTROENTEROLOGY: Soft, NT ND BS + normoactive   EXTREMITIES: No edema, cyanosis or deformity, RLE w/ ace wrap C/D/I   NEUROLOGICAL: Alert and oriented, no focal deficits, no motor or sensory deficits.  SKIN: No rash, skin turgor wnl    MEDICATIONS  (STANDING):  nicotine -   7 mG/24Hr(s) Patch 1patch Transdermal daily  ARIPiprazole 5milliGRAM(s) Oral daily  amphetamine/dextroamphetamine 20milliGRAM(s) Oral daily  venlafaxine XR. 225milliGRAM(s) Oral daily  ceFAZolin   IVPB 2000milliGRAM(s) IV Intermittent every 8 hours  enoxaparin Injectable 30milliGRAM(s) SubCutaneous every 12 hours    MEDICATIONS  (PRN):  acetaminophen   Tablet 650milliGRAM(s) Oral every 6 hours PRN For Temp greater than 38 C (100.4 F) or mild pain 1-3  oxyCODONE IR 5milliGRAM(s) Oral every 6 hours PRN Moderate Pain (4 - 6)  oxyCODONE IR 10milliGRAM(s) Oral every 6 hours PRN Severe Pain (7 - 10)  oxyCODONE  5 mG/acetaminophen 325 mG 1Tablet(s) Oral every 6 hours PRN breakthrough pain  docusate sodium 100milliGRAM(s) Oral two times a day PRN Constipation  senna 2Tablet(s) Oral at bedtime PRN Constipation      LABS:                        11.3   6.5   )-----------( 366      ( 25 May 2017 09:36 )             34.9     05-25    142  |  101  |  5.0<L>  ----------------------------<  101  4.4   |  28.0  |  0.61    Ca    9.4      25 May 2017 09:36        RADIOLOGY & ADDITIONAL TESTS:  No new imaging studies

## 2017-05-26 DIAGNOSIS — B95.0 STREPTOCOCCUS, GROUP A, AS THE CAUSE OF DISEASES CLASSIFIED ELSEWHERE: ICD-10-CM

## 2017-05-26 DIAGNOSIS — S82.899A OTHER FRACTURE OF UNSPECIFIED LOWER LEG, INITIAL ENCOUNTER FOR CLOSED FRACTURE: ICD-10-CM

## 2017-05-26 DIAGNOSIS — T81.4XXD INFECTION FOLLOWING A PROCEDURE, SUBSEQUENT ENCOUNTER: ICD-10-CM

## 2017-05-26 DIAGNOSIS — T81.31XA DISRUPTION OF EXTERNAL OPERATION (SURGICAL) WOUND, NOT ELSEWHERE CLASSIFIED, INITIAL ENCOUNTER: ICD-10-CM

## 2017-05-26 LAB
-  AMPICILLIN/SULBACTAM: SIGNIFICANT CHANGE UP
-  CEFAZOLIN: SIGNIFICANT CHANGE UP
-  CIPROFLOXACIN: SIGNIFICANT CHANGE UP
-  CLINDAMYCIN: SIGNIFICANT CHANGE UP
-  ERYTHROMYCIN: SIGNIFICANT CHANGE UP
-  GENTAMICIN: SIGNIFICANT CHANGE UP
-  LEVOFLOXACIN: SIGNIFICANT CHANGE UP
-  MOXIFLOXACIN(AEROBIC): SIGNIFICANT CHANGE UP
-  OXACILLIN: SIGNIFICANT CHANGE UP
-  PENICILLIN: SIGNIFICANT CHANGE UP
-  RIFAMPIN: SIGNIFICANT CHANGE UP
-  TETRACYCLINE: SIGNIFICANT CHANGE UP
-  TRIMETHOPRIM/SULFAMETHOXAZOLE: SIGNIFICANT CHANGE UP
-  VANCOMYCIN: SIGNIFICANT CHANGE UP
ANION GAP SERPL CALC-SCNC: 8 MMOL/L — SIGNIFICANT CHANGE UP (ref 5–17)
BASOPHILS # BLD AUTO: 0 K/UL — SIGNIFICANT CHANGE UP (ref 0–0.2)
BASOPHILS NFR BLD AUTO: 0.6 % — SIGNIFICANT CHANGE UP (ref 0–2)
BUN SERPL-MCNC: 7 MG/DL — LOW (ref 8–20)
CALCIUM SERPL-MCNC: 9.2 MG/DL — SIGNIFICANT CHANGE UP (ref 8.6–10.2)
CHLORIDE SERPL-SCNC: 104 MMOL/L — SIGNIFICANT CHANGE UP (ref 98–107)
CO2 SERPL-SCNC: 30 MMOL/L — HIGH (ref 22–29)
CREAT SERPL-MCNC: 0.57 MG/DL — SIGNIFICANT CHANGE UP (ref 0.5–1.3)
EOSINOPHIL # BLD AUTO: 0.2 K/UL — SIGNIFICANT CHANGE UP (ref 0–0.5)
EOSINOPHIL NFR BLD AUTO: 3.2 % — SIGNIFICANT CHANGE UP (ref 0–6)
GLUCOSE SERPL-MCNC: 94 MG/DL — SIGNIFICANT CHANGE UP (ref 70–115)
HCT VFR BLD CALC: 32.6 % — LOW (ref 37–47)
HGB BLD-MCNC: 10.8 G/DL — LOW (ref 12–16)
LYMPHOCYTES # BLD AUTO: 2.6 K/UL — SIGNIFICANT CHANGE UP (ref 1–4.8)
LYMPHOCYTES # BLD AUTO: 39.2 % — SIGNIFICANT CHANGE UP (ref 20–55)
MCHC RBC-ENTMCNC: 32.5 PG — HIGH (ref 27–31)
MCHC RBC-ENTMCNC: 33.1 G/DL — SIGNIFICANT CHANGE UP (ref 32–36)
MCV RBC AUTO: 98.2 FL — SIGNIFICANT CHANGE UP (ref 81–99)
METHOD TYPE: SIGNIFICANT CHANGE UP
MONOCYTES # BLD AUTO: 0.7 K/UL — SIGNIFICANT CHANGE UP (ref 0–0.8)
MONOCYTES NFR BLD AUTO: 10.5 % — HIGH (ref 3–10)
NEUTROPHILS # BLD AUTO: 3 K/UL — SIGNIFICANT CHANGE UP (ref 1.8–8)
NEUTROPHILS NFR BLD AUTO: 45.6 % — SIGNIFICANT CHANGE UP (ref 37–73)
PLATELET # BLD AUTO: 357 K/UL — SIGNIFICANT CHANGE UP (ref 150–400)
POTASSIUM SERPL-MCNC: 4.4 MMOL/L — SIGNIFICANT CHANGE UP (ref 3.5–5.3)
POTASSIUM SERPL-SCNC: 4.4 MMOL/L — SIGNIFICANT CHANGE UP (ref 3.5–5.3)
RBC # BLD: 3.32 M/UL — LOW (ref 4.4–5.2)
RBC # FLD: 12.5 % — SIGNIFICANT CHANGE UP (ref 11–15.6)
SODIUM SERPL-SCNC: 142 MMOL/L — SIGNIFICANT CHANGE UP (ref 135–145)
WBC # BLD: 6.6 K/UL — SIGNIFICANT CHANGE UP (ref 4.8–10.8)
WBC # FLD AUTO: 6.6 K/UL — SIGNIFICANT CHANGE UP (ref 4.8–10.8)

## 2017-05-26 PROCEDURE — 71010: CPT | Mod: 26

## 2017-05-26 PROCEDURE — 76937 US GUIDE VASCULAR ACCESS: CPT | Mod: 26

## 2017-05-26 PROCEDURE — 99233 SBSQ HOSP IP/OBS HIGH 50: CPT

## 2017-05-26 PROCEDURE — 36569 INSJ PICC 5 YR+ W/O IMAGING: CPT

## 2017-05-26 RX ORDER — CEFTRIAXONE 500 MG/1
2 INJECTION, POWDER, FOR SOLUTION INTRAMUSCULAR; INTRAVENOUS ONCE
Qty: 0 | Refills: 0 | Status: COMPLETED | OUTPATIENT
Start: 2017-05-26 | End: 2017-05-26

## 2017-05-26 RX ORDER — CEFTRIAXONE 500 MG/1
2 INJECTION, POWDER, FOR SOLUTION INTRAMUSCULAR; INTRAVENOUS EVERY 24 HOURS
Qty: 0 | Refills: 0 | Status: DISCONTINUED | OUTPATIENT
Start: 2017-05-27 | End: 2017-05-29

## 2017-05-26 RX ORDER — CEFTRIAXONE 500 MG/1
2 INJECTION, POWDER, FOR SOLUTION INTRAMUSCULAR; INTRAVENOUS
Qty: 1 | Refills: 0 | OUTPATIENT
Start: 2017-05-26 | End: 2017-07-07

## 2017-05-26 RX ORDER — CEFTRIAXONE 500 MG/1
INJECTION, POWDER, FOR SOLUTION INTRAMUSCULAR; INTRAVENOUS
Qty: 0 | Refills: 0 | Status: DISCONTINUED | OUTPATIENT
Start: 2017-05-26 | End: 2017-05-29

## 2017-05-26 RX ADMIN — Medication 1 PATCH: at 12:53

## 2017-05-26 RX ADMIN — ENOXAPARIN SODIUM 30 MILLIGRAM(S): 100 INJECTION SUBCUTANEOUS at 05:39

## 2017-05-26 RX ADMIN — Medication 225 MILLIGRAM(S): at 16:47

## 2017-05-26 RX ADMIN — ARIPIPRAZOLE 5 MILLIGRAM(S): 15 TABLET ORAL at 09:49

## 2017-05-26 RX ADMIN — ENOXAPARIN SODIUM 30 MILLIGRAM(S): 100 INJECTION SUBCUTANEOUS at 17:35

## 2017-05-26 RX ADMIN — OXYCODONE HYDROCHLORIDE 10 MILLIGRAM(S): 5 TABLET ORAL at 13:08

## 2017-05-26 RX ADMIN — Medication 100 MILLIGRAM(S): at 02:16

## 2017-05-26 RX ADMIN — Medication 1 PATCH: at 13:13

## 2017-05-26 RX ADMIN — CEFTRIAXONE 100 GRAM(S): 500 INJECTION, POWDER, FOR SOLUTION INTRAMUSCULAR; INTRAVENOUS at 12:52

## 2017-05-26 RX ADMIN — OXYCODONE HYDROCHLORIDE 10 MILLIGRAM(S): 5 TABLET ORAL at 14:05

## 2017-05-26 RX ADMIN — DEXTROAMPHETAMINE SACCHARATE, AMPHETAMINE ASPARTATE, DEXTROAMPHETAMINE SULFATE AND AMPHETAMINE SULFATE 20 MILLIGRAM(S): 1.875; 1.875; 1.875; 1.875 TABLET ORAL at 09:50

## 2017-05-26 NOTE — DISCHARGE NOTE ADULT - CARE PLAN
Instructions for follow-up, activity and diet:	orthopedic follow up: s/p I&D ankle wound:   PT/OT: WBAT RLE, elevate RLE.   Continue IV antibiotics as per infectious disease  maintain dressing, keep clean and dry, can remove at 10 days post operative, change with similar dressing if becomes wet or soiled. If wound drainage is noted, call orthopedic office  plan to return to [place] with possible additional surgery for remaining implant removal. Principal Discharge DX:	Postoperative wound dehiscence, sequela  Goal:	Wound care as per home care starting Tuesday.  Instructions for follow-up, activity and diet:	orthopedic follow up: s/p I&D ankle wound:   PT/OT: WBAT RLE, elevate RLE.   Continue IV antibiotics as per infectious disease  maintain dressing, keep clean and dry, can remove at 10 days post operative, change with similar dressing if becomes wet or soiled. If wound drainage is noted, call orthopedic office  plan to return to [place] with possible additional surgery for remaining implant removal.  Secondary Diagnosis:	Streptococcal arthritis of right ankle  Goal:	Home care will give you antibiotics from Tuesday, [id number] daily for 4 to 6 weeks Principal Discharge DX:	Postoperative wound dehiscence, sequela  Goal:	Wound care as per home care starting Tuesday.  Instructions for follow-up, activity and diet:	orthopedic follow up: s/p I&D ankle wound:   PT/OT: WBAT RLE, elevate RLE.   Continue IV antibiotics as per infectious disease  maintain dressing, keep clean and dry, can remove at 10 days post operative, change with similar dressing if becomes wet or soiled. If wound drainage is noted, call orthopedic office  plan to return to [place] with possible additional surgery for remaining implant removal.  please see your Orthopedic doctor earliest possible for the continuity of care  Secondary Diagnosis:	Streptococcal arthritis of right ankle  Goal:	Home care will give you antibiotics from Tuesday, [id number] daily for 4 to 6 weeks  Instructions for follow-up, activity and diet:	your need to get CBC checked every week. Principal Discharge DX:	Postoperative wound dehiscence, sequela  Goal:	Wound care as per home care starting Tuesday.  Instructions for follow-up, activity and diet:	orthopedic follow up: s/p I&D ankle wound:   PT/OT: WBAT RLE, elevate RLE.   Continue IV antibiotics as per infectious disease  maintain dressing, keep clean and dry, can remove at 10 days post operative, change with similar dressing if becomes wet or soiled. If wound drainage is noted, call orthopedic office  plan to return to [place] with possible additional surgery for remaining implant removal.  please see your Orthopedic doctor earliest possible for the continuity of care  Secondary Diagnosis:	Streptococcal arthritis of right ankle  Goal:	Home care will give you antibiotics from Tuesday, [id number] daily for 4 to 6 weeks  Instructions for follow-up, activity and diet:	your need to get CBC checked every week.

## 2017-05-26 NOTE — DISCHARGE NOTE ADULT - CARE PROVIDER_API CALL
Dr Isabel,   your Orthopedic doctor in [place], please get his appiontment ASAP.  Phone: (   )    -  Fax: (   )    -    Dr Watson,   your PMD in [place], please call and get his appiontment ASAP. please ask him to check your CBC weekly.  Phone: (   )    -  Fax: (   )    -

## 2017-05-26 NOTE — DISCHARGE NOTE ADULT - MEDICATION SUMMARY - MEDICATIONS TO TAKE
I will START or STAY ON the medications listed below when I get home from the hospital:    rocephin  -- 2 gram(s) intravenous once a day  -- Indication: For Osteomylitis     weekly cbc while on rocephin start 5/29/17  -- Indication: For Osteomylitis     acetaminophen 325 mg oral tablet  -- 2 tab(s) by mouth every 6 hours, As needed, For Temp greater than 38 C (100.4 F) or mild pain 1-3  -- Indication: For Pain     oxyCODONE 5 mg oral capsule  -- 1 cap(s) by mouth every 6 hours, As Needed for sever pain MDD:4  -- Caution federal law prohibits the transfer of this drug to any person other  than the person for whom it was prescribed.  It is very important that you take or use this exactly as directed.  Do not skip doses or discontinue unless directed by your doctor.  May cause drowsiness.  Alcohol may intensify this effect.  Use care when operating dangerous machinery.  This prescription cannot be refilled.  Using more of this medication than prescribed may cause serious breathing problems.    -- Indication: For Pain     venlafaxine 75 mg oral capsule, extended release  -- 3 cap(s) by mouth once a day  -- Indication: For Anxiety     ARIPiprazole 5 mg oral tablet  -- 1 tab(s) by mouth once a day  -- Indication: For Anxiety     amphetamine-dextroamphetamine 20 mg oral tablet  -- 1 tab(s) by mouth once a day  -- Indication: For ADHD

## 2017-05-26 NOTE — PROGRESS NOTE ADULT - ASSESSMENT
GR A STREP SEPTIC ANKLE - PROB INFECTION Hardware  HOME IV ABS ARRANGED  TO SEE HER PMD ORTHOPEDIST RE FURTHER SURGERY ON ANKLE  D/W PT

## 2017-05-26 NOTE — DISCHARGE NOTE ADULT - HOSPITAL COURSE
HPI:     26 y.o Female with PMH s/p R ankle fx and s/p ORIF right tib/fib May 2016 (had a plate and 9 screws placed) with recent hardware removal 2 weeks ago (some hardware still remains in place) on May 1st, 2017 by orthopedic Sx Dr. Sims in [place], thereafter on Monday the patient had sutures removed and then Tuesday flew to [place]. Pt has noted RLE erythema, swelling and drainage from the incision site x 2 days. Patient also reports subjective fever/chills x 1 day. Patient denies trauma, weakness, neurological deficit, N/V, chest pain or any other complaints. In ER pt evaluated by other and given kefzol and vancomycin.     Hospital course:   Patient was admitted under medicine under impression of Wound dehiscence and Septic ankle: blood cultures were taken and orthopedics consult was called,  she has elevated esr/crp, Ortho took her to the OR and did I&D wound closure using advancement flap for wound dehiscence on 5/23/17,  her woundcultures were sent and it was growing group A strep,  Bcx show no growth, patient was initially on  Vancomycine and Cefazolin, later on Vancomycine was D/jareth and was continued on Cefazolin, patient has Pan sensative Group A strep and Staph, she was seen and followed by ID over the course, she was switched to  Rocephin 2gms daily for total of 4 to 6 weeks as per ID as it is more easy to give by home care, patient's pain was well controlled with oxycodone IR 5-10mg q6hrs as needed, her pain remained controlled, she is ambulating with  Pt has known orthopedic sx in [place] who will continue to manage her care. Further surgical intervention and care as per him and her PMD in [place], patient PMD knows as well as  is undated and on board, pain is well controlled with pain meds.  case management has arranged home service for antibiotics started from Tuesday, they will provide wound care as well.        Patient was continued with abilify 5mg po qd and effexor xl 225mg po qd and adderall as prescribed.   she has Hx of smoking and was counseled on smoking cessation  and provided with nicotic patch QD.     Patient is doing well, she feels improvement, she is being discharged home in a stable condition.     Vital Signs   T(C): 36.8  HR: 75   BP: 100/62 mmhg.   RR: 18   SpO2: 98%     PHYSICAL EXAM:      GENERAL: Middle age Female looking comfortable   HEENT: PERRL, +EOMI  NECK: soft, Supple, No JVD,   CHEST/LUNG: Clear to auscultate bilaterally; No wheezing  HEART: S1S2+, Regular rate and rhythm; No murmurs.   ABDOMEN: Soft, Nontender, Nondistended; Bowel sounds present  EXTREMITIES:  2+ Peripheral Pulses, No edema  SKIN: right ankle wound with dressings on, no bleeding or soaking  NEURO: AAOX3, no focal deficits, no motor r sensory loss  PSYCH: normal mood    Total time spent 40 minutes.

## 2017-05-26 NOTE — DISCHARGE NOTE ADULT - PROVIDER TOKENS
FREE:[LAST:[Dr Isabel],PHONE:[(   )    -],FAX:[(   )    -],ADDRESS:[your Orthopedic doctor in [place], please get his appiontment ASAP.]],FREE:[LAST:[Dr Watson],PHONE:[(   )    -],FAX:[(   )    -],ADDRESS:[your PMD in [place], please call and get his appiontment ASAP. please ask him to check your CBC weekly.]]

## 2017-05-26 NOTE — PROGRESS NOTE ADULT - PROBLEM SELECTOR PLAN 1
Septic ankle   -Pt remains afebrile  -leukocytosis wnl  -elevated esr/crp   -s/p I&D wound closure using advancement flap for wound dehiscence on 5/23/17     -Surgical cx shows group A strep   -Bcx show no growth   -c/w cefazolin 2G IVPB Q8hrs D#7 for total of 6 weeks   -c/w oxycodone IR 5-10mg q6hrs depending on severity of pain for severe pain     -c/w percoset prn for breakthrough pain   -c/w tylenol for mild pain  -Ortho f/u noted and appreciated   -c/w wound care as per ortho  -Pt has known orthopedic sx in [place] who will continue to manage her care. Further surgical intervention and care as per him and her PMD in [place].

## 2017-05-26 NOTE — PROGRESS NOTE ADULT - ATTENDING COMMENTS
Dispo: PT consulted, pt to WBAT. Will likely need PICC line and will likely need further surgical intervention and management in [place] depending on clinical course.
Dispo: As per PT pt independent w/ crutches. PICC line in place. D/w ortho/ID for d/c planning. Patient is to drive back to [place] on monday. D/w CM will set up infusion w/ CM in [place] at the  base. Pt will stay hospitalized until monday and receive her dose of rocephin prior to being discharged and then resume care as per ortho/pmd in [place].
Dispo: As per PT pt independent w/ crutches. PICC line ordered and thereafter pt to continue care in [place]. Will d/w ID and ortho for further plan of care.
Dispo: As per PT pt independent w/ crutches. Will likely need PICC line and will likely need further surgical intervention and management in [place] depending on clinical course and wound cx results.
Dispo:Pt will need extensive surgical intervention for wound dehiscence and she wants to continue mx in [place] but has known orthopedic surgeons in [place]. Will continue to discuss with her in regards to receiving care in [place] vs [place], d/w ortho and the orthopedic sx in [place] will continue to manage her care.

## 2017-05-26 NOTE — PROGRESS NOTE ADULT - SUBJECTIVE AND OBJECTIVE BOX
Patient is a 26y old  Female who presents with a chief complaint of RLE swelling, redness and discharge (20 May 2017 16:39)      HEALTH ISSUES - PROBLEM Dx:  Postoperative infection, subsequent encounter: Postoperative infection, subsequent encounter  Ankle fracture: Ankle fracture  Group A streptococcal infection: Group A streptococcal infection  Prophylactic measure: Prophylactic measure  Smoker: Smoker  Depression: Depression  Cellulitis of right ankle: Cellulitis of right ankle      INTERVAL HPI/OVERNIGHT EVENTS:  Patient seen and examined. No acute events overnight. Patient states she is feeling well, pain well controlled on pain medication. Patient did have a bm yesterday. Patient understands the plan of care and agrees to stay in the hospital until monday. Patient denies chest pain, SOB, abd pain, N/V, fever, chills, dysuria or any other complaints. All remainder ROS negative.     Vital Signs Last 24 Hrs  T(C): 36.9, Max: 37.1 (05-25 @ 23:06)  T(F): 98.4, Max: 98.7 (05-25 @ 23:06)  HR: 80 (70 - 80)  BP: 124/71 (122/62 - 129/78)  BP(mean): --  RR: 19 (18 - 19)  SpO2: 99% (98% - 99%)      I&O's Summary    I & Os for current day (as of 26 May 2017 12:50)  =============================================  IN: 50 ml / OUT: 0 ml / NET: 50 ml      CONSTITUTIONAL: Well appearing, well nourished, awake, alert and in no apparent distress  CARDIAC: Normal rate, regular rhythm.  Heart sounds S1, S2.  No murmurs, rubs or gallops   RESPIRATORY: Breath sounds clear and equal bilaterally. No wheezes, rhales or rhonchi  GASTROENTEROLOGY: Soft, NT ND BS + normoactive   EXTREMITIES: No edema, cyanosis or deformity, RLE w/ ace wrap C/D/I   NEUROLOGICAL: Alert and oriented, no focal deficits, no motor or sensory deficits.  SKIN: No rash, skin turgor wnl    MEDICATIONS  (STANDING):  nicotine -   7 mG/24Hr(s) Patch 1patch Transdermal daily  ARIPiprazole 5milliGRAM(s) Oral daily  amphetamine/dextroamphetamine 20milliGRAM(s) Oral daily  venlafaxine XR. 225milliGRAM(s) Oral daily  enoxaparin Injectable 30milliGRAM(s) SubCutaneous every 12 hours  cefTRIAXone   IVPB 2Gram(s) IV Intermittent once  cefTRIAXone   IVPB  IV Intermittent     MEDICATIONS  (PRN):  acetaminophen   Tablet 650milliGRAM(s) Oral every 6 hours PRN For Temp greater than 38 C (100.4 F) or mild pain 1-3  oxyCODONE IR 5milliGRAM(s) Oral every 6 hours PRN Moderate Pain (4 - 6)  oxyCODONE IR 10milliGRAM(s) Oral every 6 hours PRN Severe Pain (7 - 10)  oxyCODONE  5 mG/acetaminophen 325 mG 1Tablet(s) Oral every 6 hours PRN breakthrough pain  docusate sodium 100milliGRAM(s) Oral two times a day PRN Constipation  senna 2Tablet(s) Oral at bedtime PRN Constipation      LABS:                        10.8   6.6   )-----------( 357      ( 26 May 2017 08:11 )             32.6     05-26    142  |  104  |  7.0<L>  ----------------------------<  94  4.4   |  30.0<H>  |  0.57    Ca    9.2      26 May 2017 08:11        RADIOLOGY & ADDITIONAL TESTS:  No new imaging studies

## 2017-05-26 NOTE — PROGRESS NOTE ADULT - SUBJECTIVE AND OBJECTIVE BOX
pt seen and examined 3 days s/p right ankle wound I&D. states pain well controlled. Denies constitutional sx, CP, SOB, LE N/T. PICC lined placed today    Vital Signs Last 24 Hrs  T(C): 36.9, Max: 37.1 (05-25 @ 23:06)  T(F): 98.4, Max: 98.7 (05-25 @ 23:06)  HR: 80 (70 - 80)  BP: 124/71 (122/62 - 129/78)  BP(mean): --  RR: 19 (18 - 19)  SpO2: 99% (98% - 99%)    PE: NAD, resting comfortably  RLE/ankle:  dressing C/D/I  gross sensation intact to light touch  gross motor intact  pulses appreciated, cap refill < 2 sec  compartments soft, NT b/l LE                          10.8   6.6   )-----------( 357      ( 26 May 2017 08:11 )             32.6     A/P 25 y/o female POD # 3 right ankle wound I&D, ortho stable    pain control  continue IV abx as per ID  PICC   PT/OT: WBAT  keep RLE elevated  DVT PPX  continue care with primary team

## 2017-05-26 NOTE — PROCEDURE NOTE - NSPROCDETAILS_GEN_ALL_CORE
location identified, draped/prepped, sterile technique used/sterile technique, catheter placed/ultrasound guidance/sterile dressing applied

## 2017-05-26 NOTE — PROGRESS NOTE ADULT - SUBJECTIVE AND OBJECTIVE BOX
[facility]FECTI[facility] DISEAS[facility] [place] KANDI BARRETO MD FACP   CHRISTIAN CHAVES MD  Diplomates American Board of Internal Medicine and Infectious Diseases      DARREL ALEGRIA  [id number]  26y    INTERVAL HPI/OVERNIGHT EVENTS:  post op WASH OUT  FINAL C/S GR A STREP - PT INFORMED  HOME IV ROCEPHIN 6 WEEKS TILL SEEN BU HER ORTHOP IN [place]    Vital Signs Last 24 Hrs  T(C): 36.9, Max: 37.1 (05-25 @ 23:06)  T(F): 98.4, Max: 98.7 (05-25 @ 23:06)  HR: 80 (70 - 80)  BP: 124/71 (122/62 - 129/78)  BP(mean): --  RR: 19 (18 - 19)  SpO2: 99% (98% - 99%)    ANTIBIOTICS  cefTRIAXone   IVPB  IV Intermittent       Allergies    No Known Allergies    Intolerances        REVIEW OF SYSTEMS:NEG    PHYSICAL EXAM:  Vital Signs Last 24 Hrs  T(C): 36.9, Max: 37.1 (05-25 @ 23:06)  T(F): 98.4, Max: 98.7 (05-25 @ 23:06)  HR: 80 (70 - 80)  BP: 124/71 (122/62 - 129/78)  BP(mean): --  RR: 19 (18 - 19)  SpO2: 99% (98% - 99%)      GEN: NAD, pleasant  HEENT: normocephalic and atraumatic. EOMI. BERNARDO. Moist mucosa. Clear Posterior pharynx.  NECK: Supple. No carotid bruits.  No lymphadenopathy or thyromegaly.  LUNGS: Clear to auscultation.  HEART: Regular rate and rhythm without murmur.  ABDOMEN: Soft, nontender, and nondistended.  Positive bowel sounds.  No hepatosplenomegaly was noted.  EXTREMITIES: Without any cyanosis, clubbing, rash, lesions or edema.  NEUROLOGIC: Cranial nerves II through XII are grossly intact.  MUSCULOSKELETAL:WRAPPED  SKIN: No ulceration or induration present    LABS:                        10.8   6.6   )-----------( 357      ( 26 May 2017 08:11 )             32.6       05-26    142  |  104  |  7.0<L>  ----------------------------<  94  4.4   |  30.0<H>  |  0.57    Ca    9.2      26 May 2017 08:11          05-26 @ 08:11  hct 32.6 % hgb 10.8 g/dL    05-26 @ 08:11  plat 357 K/uL wbc 6.6 K/uL    05-25 @ 09:36  hct 34.9 % hgb 11.3 g/dL    05-25 @ 09:36  plat 366 K/uL wbc 6.5 K/uL    05-24 @ 08:08  hct 30.8 % hgb 10.1 g/dL    05-24 @ 08:08  plat 337 K/uL wbc 8.3 K/uL      05-26-17  creat 0.57 mg/dL gfr 150 mL/min/1.73M2 bun 7.0 mg/dL k 4.4 mmol/L  05-25-17  creat 0.61 mg/dL gfr 147 mL/min/1.73M2 bun 5.0 mg/dL k 4.4 mmol/L  05-24-17  creat 0.55 mg/dL gfr 152 mL/min/1.73M2 bun 3.0 mg/dL k 4.2 mmol/L      MICROBIOLOGY:  Culture Results:   Rare Streptococcus pyogenes (Group A)  Culture in progress (05-23 @ 14:50)  Culture Results:   Rare Staphylococcus aureus Susceptibility to follow.  Culture in progress (05-23 @ 14:47)  Culture Results:   Rare Streptococcus pyogenes (Group A)  Culture in progress (05-23 @ 14:38)  Culture Results:   No growth at 5 days. (05-20 @ 15:29)  Culture Results:   Numerous Streptococcus pyogenes (Group A)  Few Staphylococcus aureus (05-20 @ 15:24)  Culture Results:   No growth at 5 days. (05-20 @ 14:57)        RADIOLOGY & ADDITIONAL STUDIES:          FÉLIX BARRETO MD FACP

## 2017-05-26 NOTE — DISCHARGE NOTE ADULT - PATIENT PORTAL LINK FT
“You can access the Follow[employer] Patient Portal, offered by [facility], by registering with the following website: [email or web address]”

## 2017-05-26 NOTE — DISCHARGE NOTE ADULT - PLAN OF CARE
orthopedic follow up: s/p I&D ankle wound:   PT/OT: WBAT RLE, elevate RLE.   Continue IV antibiotics as per infectious disease  maintain dressing, keep clean and dry, can remove at 10 days post operative, change with similar dressing if becomes wet or soiled. If wound drainage is noted, call orthopedic office  plan to return to [place] with possible additional surgery for remaining implant removal. Wound care as per home care starting Tuesday. Home care will give you antibiotics from Tuesday, [id number] daily for 4 to 6 weeks your need to get CBC checked every week. orthopedic follow up: s/p I&D ankle wound:   PT/OT: WBAT RLE, elevate RLE.   Continue IV antibiotics as per infectious disease  maintain dressing, keep clean and dry, can remove at 10 days post operative, change with similar dressing if becomes wet or soiled. If wound drainage is noted, call orthopedic office  plan to return to [place] with possible additional surgery for remaining implant removal.  please see your Orthopedic doctor earliest possible for the continuity of care

## 2017-05-26 NOTE — PROGRESS NOTE ADULT - ASSESSMENT
26 y.o. F w/ PMH depression, s/p R ankle fx and s/p ORIF right tib/fib May 2016 (had a plate and 9 screws placed) with recent hardware removal 2 weeks ago (some hardware still remains in place) on May 1st, 2017 and suture removal on 5/15/17, presents with RLE swelling, erythema and purulent discharge admitted w/ left medial ankle surgical wound dehiscence. S/p I&D 5/23/17, wound cx which group A strep. Pt s/p picc line placed today. Discharging planning in place for monday.

## 2017-05-27 LAB
-  CLINDAMYCIN: SIGNIFICANT CHANGE UP
-  ERYTHROMYCIN: SIGNIFICANT CHANGE UP
-  LEVOFLOXACIN: SIGNIFICANT CHANGE UP
-  PENICILLIN: SIGNIFICANT CHANGE UP
-  VANCOMYCIN: SIGNIFICANT CHANGE UP
ALBUMIN SERPL ELPH-MCNC: 3.8 G/DL — SIGNIFICANT CHANGE UP (ref 3.3–5.2)
ALP SERPL-CCNC: 76 U/L — SIGNIFICANT CHANGE UP (ref 40–120)
ALT FLD-CCNC: 16 U/L — SIGNIFICANT CHANGE UP
ANION GAP SERPL CALC-SCNC: 11 MMOL/L — SIGNIFICANT CHANGE UP (ref 5–17)
AST SERPL-CCNC: 23 U/L — SIGNIFICANT CHANGE UP
BASOPHILS # BLD AUTO: 0 K/UL — SIGNIFICANT CHANGE UP (ref 0–0.2)
BASOPHILS NFR BLD AUTO: 0.6 % — SIGNIFICANT CHANGE UP (ref 0–2)
BILIRUB SERPL-MCNC: 0.2 MG/DL — LOW (ref 0.4–2)
BUN SERPL-MCNC: 9 MG/DL — SIGNIFICANT CHANGE UP (ref 8–20)
CALCIUM SERPL-MCNC: 9.3 MG/DL — SIGNIFICANT CHANGE UP (ref 8.6–10.2)
CHLORIDE SERPL-SCNC: 101 MMOL/L — SIGNIFICANT CHANGE UP (ref 98–107)
CO2 SERPL-SCNC: 28 MMOL/L — SIGNIFICANT CHANGE UP (ref 22–29)
CREAT SERPL-MCNC: 0.52 MG/DL — SIGNIFICANT CHANGE UP (ref 0.5–1.3)
EOSINOPHIL # BLD AUTO: 0.2 K/UL — SIGNIFICANT CHANGE UP (ref 0–0.5)
EOSINOPHIL NFR BLD AUTO: 2.3 % — SIGNIFICANT CHANGE UP (ref 0–6)
GLUCOSE SERPL-MCNC: 91 MG/DL — SIGNIFICANT CHANGE UP (ref 70–115)
HCT VFR BLD CALC: 34.1 % — LOW (ref 37–47)
HGB BLD-MCNC: 11.4 G/DL — LOW (ref 12–16)
LYMPHOCYTES # BLD AUTO: 2.3 K/UL — SIGNIFICANT CHANGE UP (ref 1–4.8)
LYMPHOCYTES # BLD AUTO: 33 % — SIGNIFICANT CHANGE UP (ref 20–55)
MCHC RBC-ENTMCNC: 33 PG — HIGH (ref 27–31)
MCHC RBC-ENTMCNC: 33.4 G/DL — SIGNIFICANT CHANGE UP (ref 32–36)
MCV RBC AUTO: 98.8 FL — SIGNIFICANT CHANGE UP (ref 81–99)
METHOD TYPE: SIGNIFICANT CHANGE UP
MONOCYTES # BLD AUTO: 0.8 K/UL — SIGNIFICANT CHANGE UP (ref 0–0.8)
MONOCYTES NFR BLD AUTO: 11.4 % — HIGH (ref 3–10)
NEUTROPHILS # BLD AUTO: 3.6 K/UL — SIGNIFICANT CHANGE UP (ref 1.8–8)
NEUTROPHILS NFR BLD AUTO: 52 % — SIGNIFICANT CHANGE UP (ref 37–73)
PLATELET # BLD AUTO: 415 K/UL — HIGH (ref 150–400)
POTASSIUM SERPL-MCNC: 4.7 MMOL/L — SIGNIFICANT CHANGE UP (ref 3.5–5.3)
POTASSIUM SERPL-SCNC: 4.7 MMOL/L — SIGNIFICANT CHANGE UP (ref 3.5–5.3)
PROT SERPL-MCNC: 7.5 G/DL — SIGNIFICANT CHANGE UP (ref 6.6–8.7)
RBC # BLD: 3.45 M/UL — LOW (ref 4.4–5.2)
RBC # FLD: 12.6 % — SIGNIFICANT CHANGE UP (ref 11–15.6)
SODIUM SERPL-SCNC: 140 MMOL/L — SIGNIFICANT CHANGE UP (ref 135–145)
WBC # BLD: 6.9 K/UL — SIGNIFICANT CHANGE UP (ref 4.8–10.8)
WBC # FLD AUTO: 6.9 K/UL — SIGNIFICANT CHANGE UP (ref 4.8–10.8)

## 2017-05-27 PROCEDURE — 99233 SBSQ HOSP IP/OBS HIGH 50: CPT

## 2017-05-27 RX ADMIN — ENOXAPARIN SODIUM 30 MILLIGRAM(S): 100 INJECTION SUBCUTANEOUS at 17:15

## 2017-05-27 RX ADMIN — OXYCODONE HYDROCHLORIDE 10 MILLIGRAM(S): 5 TABLET ORAL at 06:25

## 2017-05-27 RX ADMIN — ARIPIPRAZOLE 5 MILLIGRAM(S): 15 TABLET ORAL at 11:41

## 2017-05-27 RX ADMIN — Medication 1 PATCH: at 11:30

## 2017-05-27 RX ADMIN — CEFTRIAXONE 100 GRAM(S): 500 INJECTION, POWDER, FOR SOLUTION INTRAMUSCULAR; INTRAVENOUS at 08:29

## 2017-05-27 RX ADMIN — DEXTROAMPHETAMINE SACCHARATE, AMPHETAMINE ASPARTATE, DEXTROAMPHETAMINE SULFATE AND AMPHETAMINE SULFATE 20 MILLIGRAM(S): 1.875; 1.875; 1.875; 1.875 TABLET ORAL at 11:40

## 2017-05-27 RX ADMIN — Medication 225 MILLIGRAM(S): at 11:40

## 2017-05-27 RX ADMIN — ENOXAPARIN SODIUM 30 MILLIGRAM(S): 100 INJECTION SUBCUTANEOUS at 05:55

## 2017-05-27 RX ADMIN — OXYCODONE HYDROCHLORIDE 10 MILLIGRAM(S): 5 TABLET ORAL at 05:55

## 2017-05-27 RX ADMIN — OXYCODONE HYDROCHLORIDE 10 MILLIGRAM(S): 5 TABLET ORAL at 13:55

## 2017-05-27 RX ADMIN — Medication 1 PATCH: at 11:40

## 2017-05-27 RX ADMIN — OXYCODONE HYDROCHLORIDE 10 MILLIGRAM(S): 5 TABLET ORAL at 14:30

## 2017-05-27 NOTE — PROGRESS NOTE ADULT - SUBJECTIVE AND OBJECTIVE BOX
[facility] DISEAS[facility]IC[facility] [place] KANDI BARRETO MD FACP   CHRISTIAN CHAVES MD  Diplomates American Board of Internal Medicine and Infectious Diseases      DARREL ALEGRIA  [id number]  26y    INTERVAL HPI/OVERNIGHT EVENTS:  post op WASH OUT  FINAL C/S GR A STREP - PT INFORMED  HOME IV ROCEPHIN 6 WEEKS TILL SEEN BU HER ORTHOP IN [place]  D/C PLAN FOR MONDAY    Vital Signs Last 24 Hrs  T(C): 36.9, Max: 37.1 (05-25 @ 23:06)  T(F): 98.4, Max: 98.7 (05-25 @ 23:06)  HR: 80 (70 - 80)  BP: 124/71 (122/62 - 129/78)  BP(mean): --  RR: 19 (18 - 19)  SpO2: 99% (98% - 99%)    ANTIBIOTICS  cefTRIAXone   IVPB  IV Intermittent       Allergies    No Known Allergies    Intolerances        REVIEW OF SYSTEMS:NEG    PHYSICAL EXAM:  Vital Signs Last 24 Hrs  T(C): 36.9, Max: 37.1 (05-25 @ 23:06)  T(F): 98.4, Max: 98.7 (05-25 @ 23:06)  HR: 80 (70 - 80)  BP: 124/71 (122/62 - 129/78)  BP(mean): --  RR: 19 (18 - 19)  SpO2: 99% (98% - 99%)      GEN: NAD, pleasant  HEENT: normocephalic and atraumatic. EOMI. BERNARDO. Moist mucosa. Clear Posterior pharynx.  NECK: Supple. No carotid bruits.  No lymphadenopathy or thyromegaly.  LUNGS: Clear to auscultation.  HEART: Regular rate and rhythm without murmur.  ABDOMEN: Soft, nontender, and nondistended.  Positive bowel sounds.  No hepatosplenomegaly was noted.  EXTREMITIES: Without any cyanosis, clubbing, rash, lesions or edema.  NEUROLOGIC: Cranial nerves II through XII are grossly intact.  MUSCULOSKELETAL:WRAPPED  SKIN: No ulceration or induration present    LABS:                        10.8   6.6   )-----------( 357      ( 26 May 2017 08:11 )             32.6       05-26    142  |  104  |  7.0<L>  ----------------------------<  94  4.4   |  30.0<H>  |  0.57    Ca    9.2      26 May 2017 08:11          05-26 @ 08:11  hct 32.6 % hgb 10.8 g/dL    05-26 @ 08:11  plat 357 K/uL wbc 6.6 K/uL    05-25 @ 09:36  hct 34.9 % hgb 11.3 g/dL    05-25 @ 09:36  plat 366 K/uL wbc 6.5 K/uL    05-24 @ 08:08  hct 30.8 % hgb 10.1 g/dL    05-24 @ 08:08  plat 337 K/uL wbc 8.3 K/uL      05-26-17  creat 0.57 mg/dL gfr 150 mL/min/1.73M2 bun 7.0 mg/dL k 4.4 mmol/L  05-25-17  creat 0.61 mg/dL gfr 147 mL/min/1.73M2 bun 5.0 mg/dL k 4.4 mmol/L  05-24-17  creat 0.55 mg/dL gfr 152 mL/min/1.73M2 bun 3.0 mg/dL k 4.2 mmol/L      MICROBIOLOGY:  Culture Results:   Rare Streptococcus pyogenes (Group A)  Culture in progress (05-23 @ 14:50)  Culture Results:   Rare Staphylococcus aureus Susceptibility to follow.  Culture in progress (05-23 @ 14:47)  Culture Results:   Rare Streptococcus pyogenes (Group A)  Culture in progress (05-23 @ 14:38)  Culture Results:   No growth at 5 days. (05-20 @ 15:29)  Culture Results:   Numerous Streptococcus pyogenes (Group A)  Few Staphylococcus aureus (05-20 @ 15:24)  Culture Results:   No growth at 5 days. (05-20 @ 14:57)        RADIOLOGY & ADDITIONAL STUDIES:          FÉLIX BARRETO MD FACP

## 2017-05-27 NOTE — PROGRESS NOTE ADULT - PROBLEM SELECTOR PLAN 1
ROCPHIN X 4-6 WEEKS  DEFER TO ORTHOP IN [place]  D/W PT - SHE IS AWARE OF ORGANISM AND NEED FOR LENGTH OF TX

## 2017-05-27 NOTE — PROGRESS NOTE ADULT - SUBJECTIVE AND OBJECTIVE BOX
DARREL ALEGRIA    [id number]    26y      Female    Patient is a 26y old  Female who presents with a chief complaint of RLE swelling, redness and discharge (26 May 2017 17:14)      INTERVAL HPI/OVERNIGHT EVENTS:    Patient is seen and evaluated, patient has no complains, her pain is well controlled with pain meds, has no fever, chills, chest pain, nausea, vomiting, she denies any bleeding or any discharge     REVIEW OF SYSTEMS:    CONSTITUTIONAL: No fever, some fatigue  RESPIRATORY: No cough, No shortness of breath  CARDIOVASCULAR: No chest pain, palpitations  GASTROINTESTINAL: No abdominal No nausea, vomiting  NEUROLOGICAL: No headaches,  loss of strength.  MISCELLANEOUS: Tolerable pain in the right ankle      Vital Signs Last 24 Hrs  T(C): 37, Max: 37.1 (05-27 @ 04:25)  T(F): 98.6, Max: 98.7 (05-27 @ 04:25)  HR: 78 (63 - 80)  BP: 107/62 (95/52 - 114/61)  RR: 18 (18 - 18)  SpO2: 96% (93% - 99%)    PHYSICAL EXAM:    GENERAL: Middle age Female looking comfortable   HEENT: PERRL, +EOMI  NECK: soft, Supple, No JVD,   CHEST/LUNG: Clear to auscultate bilaterally; No wheezing  HEART: S1S2+, Regular rate and rhythm; No murmurs.   ABDOMEN: Soft, Nontender, Nondistended; Bowel sounds present  EXTREMITIES:  2+ Peripheral Pulses, No edema  SKIN: right ankle wound with dressings on, no bleeding or soaking  NEURO: AAOX3, no focal deficits, no motor r sensory loss  PSYCH: normal mood      LABS:                        11.4   6.9   )-----------( 415      ( 27 May 2017 08:31 )             34.1     05-27    140  |  101  |  9.0  ----------------------------<  91  4.7   |  28.0  |  0.52    Ca    9.3      27 May 2017 08:31    TPro  7.5  /  Alb  3.8  /  TBili  0.2<L>  /  DBili  x   /  AST  23  /  ALT  16  /  AlkPhos  76  05-27      I&O's Summary      MEDICATIONS  (STANDING):  nicotine -   7 mG/24Hr(s) Patch 1patch Transdermal daily  ARIPiprazole 5milliGRAM(s) Oral daily  amphetamine/dextroamphetamine 20milliGRAM(s) Oral daily  venlafaxine XR. 225milliGRAM(s) Oral daily  enoxaparin Injectable 30milliGRAM(s) SubCutaneous every 12 hours  cefTRIAXone   IVPB 2Gram(s) IV Intermittent every 24 hours  cefTRIAXone   IVPB  IV Intermittent     MEDICATIONS  (PRN):  acetaminophen   Tablet 650milliGRAM(s) Oral every 6 hours PRN For Temp greater than 38 C (100.4 F) or mild pain 1-3  oxyCODONE IR 5milliGRAM(s) Oral every 6 hours PRN Moderate Pain (4 - 6)  oxyCODONE IR 10milliGRAM(s) Oral every 6 hours PRN Severe Pain (7 - 10)  oxyCODONE  5 mG/acetaminophen 325 mG 1Tablet(s) Oral every 6 hours PRN breakthrough pain  docusate sodium 100milliGRAM(s) Oral two times a day PRN Constipation  senna 2Tablet(s) Oral at bedtime PRN Constipation

## 2017-05-27 NOTE — PROGRESS NOTE ADULT - PROBLEM SELECTOR PLAN 1
Septic ankle   -Pt remains afebrile  -leukocytosis wnl  -elevated esr/crp   -s/p I&D wound closure using advancement flap for wound dehiscence on 5/23/17     -Surgical cx shows group A strep   -Bcx show no growth   -c/w cefazolin 2G IVPB Q8hrs D#7 for total of 6 weeks   -c/w oxycodone IR 5-10mg q6hrs depending on severity of pain for severe pain     -c/w percoset prn for breakthrough pain   -c/w tylenol for mild pain  -Ortho f/u noted and appreciated   -c/w wound care as per ortho  -Pt has known orthopedic sx in [place] who will continue to manage her care. Further surgical intervention and care as per him and her PMD in [place], patient PMD knows as well as  is undated and on board, pain is well controlled with pain meds.

## 2017-05-27 NOTE — PROGRESS NOTE ADULT - ASSESSMENT
GR A STREP SEPTIC ANKLE - PROB INFECTION Hardware  HOME IV ABS ARRANGED  TO SEE HER PMD ORTHOPEDIST RE FURTHER SURGERY ON ANKLE  D/W PT  WILL NO LONGER SEE

## 2017-05-28 LAB
CULTURE RESULTS: SIGNIFICANT CHANGE UP
ORGANISM # SPEC MICROSCOPIC CNT: SIGNIFICANT CHANGE UP
SPECIMEN SOURCE: SIGNIFICANT CHANGE UP

## 2017-05-28 PROCEDURE — 99232 SBSQ HOSP IP/OBS MODERATE 35: CPT

## 2017-05-28 RX ORDER — ARIPIPRAZOLE 15 MG/1
1 TABLET ORAL
Qty: 0 | Refills: 0 | COMMUNITY
Start: 2017-05-28

## 2017-05-28 RX ORDER — DEXTROAMPHETAMINE SACCHARATE, AMPHETAMINE ASPARTATE, DEXTROAMPHETAMINE SULFATE AND AMPHETAMINE SULFATE 1.875; 1.875; 1.875; 1.875 MG/1; MG/1; MG/1; MG/1
1 TABLET ORAL
Qty: 0 | Refills: 0 | COMMUNITY
Start: 2017-05-28

## 2017-05-28 RX ORDER — ARIPIPRAZOLE 15 MG/1
5 TABLET ORAL
Qty: 0 | Refills: 0 | COMMUNITY

## 2017-05-28 RX ORDER — DEXTROAMPHETAMINE SACCHARATE, AMPHETAMINE ASPARTATE, DEXTROAMPHETAMINE SULFATE AND AMPHETAMINE SULFATE 1.875; 1.875; 1.875; 1.875 MG/1; MG/1; MG/1; MG/1
20 TABLET ORAL
Qty: 0 | Refills: 0 | COMMUNITY

## 2017-05-28 RX ORDER — VENLAFAXINE HCL 75 MG
225 CAPSULE, EXT RELEASE 24 HR ORAL
Qty: 0 | Refills: 0 | COMMUNITY

## 2017-05-28 RX ORDER — ACETAMINOPHEN 500 MG
2 TABLET ORAL
Qty: 0 | Refills: 0 | COMMUNITY
Start: 2017-05-28

## 2017-05-28 RX ORDER — OXYCODONE HYDROCHLORIDE 5 MG/1
1 TABLET ORAL
Qty: 12 | Refills: 0 | OUTPATIENT
Start: 2017-05-28 | End: 2017-05-31

## 2017-05-28 RX ORDER — VENLAFAXINE HCL 75 MG
3 CAPSULE, EXT RELEASE 24 HR ORAL
Qty: 0 | Refills: 0 | COMMUNITY
Start: 2017-05-28

## 2017-05-28 RX ADMIN — OXYCODONE HYDROCHLORIDE 10 MILLIGRAM(S): 5 TABLET ORAL at 11:10

## 2017-05-28 RX ADMIN — ARIPIPRAZOLE 5 MILLIGRAM(S): 15 TABLET ORAL at 11:08

## 2017-05-28 RX ADMIN — Medication 225 MILLIGRAM(S): at 11:08

## 2017-05-28 RX ADMIN — Medication 1 PATCH: at 11:10

## 2017-05-28 RX ADMIN — DEXTROAMPHETAMINE SACCHARATE, AMPHETAMINE ASPARTATE, DEXTROAMPHETAMINE SULFATE AND AMPHETAMINE SULFATE 20 MILLIGRAM(S): 1.875; 1.875; 1.875; 1.875 TABLET ORAL at 11:08

## 2017-05-28 RX ADMIN — ENOXAPARIN SODIUM 30 MILLIGRAM(S): 100 INJECTION SUBCUTANEOUS at 07:42

## 2017-05-28 RX ADMIN — OXYCODONE HYDROCHLORIDE 10 MILLIGRAM(S): 5 TABLET ORAL at 10:25

## 2017-05-28 RX ADMIN — CEFTRIAXONE 100 GRAM(S): 500 INJECTION, POWDER, FOR SOLUTION INTRAMUSCULAR; INTRAVENOUS at 10:25

## 2017-05-28 RX ADMIN — Medication 1 PATCH: at 11:08

## 2017-05-28 NOTE — PROGRESS NOTE ADULT - SUBJECTIVE AND OBJECTIVE BOX
DARREL ALEGRIA    [id number]    26y      Female    Patient is a 26y old  Female who presents with a chief complaint of RLE swelling, redness and discharge (26 May 2017 17:14)      INTERVAL HPI/OVERNIGHT EVENTS:    Patient is seen and evaluated, patient has no complains, her pain is well controlled with pain meds, has no fever, chills, chest pain, nausea, vomiting, she denies any bleeding or any discharge, she wanted to sing out ama, but convinced to stay.     REVIEW OF SYSTEMS:    CONSTITUTIONAL: No fever, some fatigue  RESPIRATORY: No cough, No shortness of breath  CARDIOVASCULAR: No chest pain, palpitations  GASTROINTESTINAL: No abdominal No nausea, vomiting  NEUROLOGICAL: No headaches,  loss of strength.  MISCELLANEOUS: Tolerable pain in the right ankle        Vital Signs Last 24 Hrs  T(C): 36.6, Max: 37 (05-28 @ 08:51)  T(F): 97.9, Max: 98.6 (05-28 @ 08:51)  HR: 74 (74 - 75)  BP: 109/59 (107/67 - 109/59)  RR: 18 (18 - 18)  SpO2: 96% (96% - 98%)    PHYSICAL EXAM:    GENERAL: Middle age Female looking comfortable   HEENT: PERRL, +EOMI  NECK: soft, Supple, No JVD,   CHEST/LUNG: Clear to auscultate bilaterally; No wheezing  HEART: S1S2+, Regular rate and rhythm; No murmurs.   ABDOMEN: Soft, Nontender, Nondistended; Bowel sounds present  EXTREMITIES:  2+ Peripheral Pulses, No edema  SKIN: right ankle wound with dressings on, no bleeding or soaking  NEURO: AAOX3, no focal deficits, no motor r sensory loss  PSYCH: normal mood      LABS:                        11.4   6.9   )-----------( 415      ( 27 May 2017 08:31 )             34.1     05-27    140  |  101  |  9.0  ----------------------------<  91  4.7   |  28.0  |  0.52    Ca    9.3      27 May 2017 08:31    TPro  7.5  /  Alb  3.8  /  TBili  0.2<L>  /  DBili  x   /  AST  23  /  ALT  16  /  AlkPhos  76  05-27        I&O's Summary    I & Os for current day (as of 28 May 2017 19:26)  =============================================  IN: 600 ml / OUT: 500 ml / NET: 100 ml      MEDICATIONS  (STANDING):  nicotine -   7 mG/24Hr(s) Patch 1patch Transdermal daily  ARIPiprazole 5milliGRAM(s) Oral daily  amphetamine/dextroamphetamine 20milliGRAM(s) Oral daily  venlafaxine XR. 225milliGRAM(s) Oral daily  enoxaparin Injectable 30milliGRAM(s) SubCutaneous every 12 hours  cefTRIAXone   IVPB 2Gram(s) IV Intermittent every 24 hours  cefTRIAXone   IVPB  IV Intermittent     MEDICATIONS  (PRN):  acetaminophen   Tablet 650milliGRAM(s) Oral every 6 hours PRN For Temp greater than 38 C (100.4 F) or mild pain 1-3  oxyCODONE IR 5milliGRAM(s) Oral every 6 hours PRN Moderate Pain (4 - 6)  oxyCODONE IR 10milliGRAM(s) Oral every 6 hours PRN Severe Pain (7 - 10)  oxyCODONE  5 mG/acetaminophen 325 mG 1Tablet(s) Oral every 6 hours PRN breakthrough pain  docusate sodium 100milliGRAM(s) Oral two times a day PRN Constipation  senna 2Tablet(s) Oral at bedtime PRN Constipation

## 2017-05-28 NOTE — PROGRESS NOTE ADULT - PROBLEM SELECTOR PROBLEM 2
Depression
Depression
Ankle fracture
Depression
Ankle fracture

## 2017-05-28 NOTE — PROGRESS NOTE ADULT - PROBLEM SELECTOR PLAN 1
Septic ankle, Pt remains afebrile  -leukocytosis wnl  -elevated esr/crp   -s/p I&D wound closure using advancement flap for wound dehiscence on 5/23/17     -Surgical cx shows group A strep   -Bcx show no growth   Antibiotic was changed to cefazolin 2G IVPB Q8hrs D#8, will continue with Rocephin for total of 4 to 6 weeks as per ID. patient wanted to go home today and was planing to signout AMA because of her friend was leaving to [place] , but convinced to sat for 1 more night.   -c/w oxycodone IR 5-10mg q6hrs depending on severity of pain for severe pain     -c/w percoset prn for breakthrough pain   -c/w tylenol for mild pain  -Ortho f/u noted and appreciated   -c/w wound care as per ortho  -Pt has known orthopedic sx in [place] who will continue to manage her care. Further surgical intervention and care as per him and her PMD in [place], patient PMD knows as well as  is undated and on board, pain is well controlled with pain meds.  case management has arranged home service for antibiotics started from Tuesday.

## 2017-05-28 NOTE — PROGRESS NOTE ADULT - PROBLEM SELECTOR PROBLEM 3
Postoperative infection, subsequent encounter
Smoker
Postoperative infection, subsequent encounter

## 2017-05-28 NOTE — PROGRESS NOTE ADULT - PROBLEM SELECTOR PROBLEM 1
Cellulitis of right ankle
Wound dehiscence, surgical
Cellulitis of right ankle
Group A streptococcal infection
Wound dehiscence, surgical
Wound dehiscence, surgical
Group A streptococcal infection

## 2017-05-29 VITALS
RESPIRATION RATE: 18 BRPM | OXYGEN SATURATION: 97 % | SYSTOLIC BLOOD PRESSURE: 118 MMHG | DIASTOLIC BLOOD PRESSURE: 76 MMHG | HEART RATE: 88 BPM

## 2017-05-29 PROCEDURE — 80202 ASSAY OF VANCOMYCIN: CPT

## 2017-05-29 PROCEDURE — 83036 HEMOGLOBIN GLYCOSYLATED A1C: CPT

## 2017-05-29 PROCEDURE — 99239 HOSP IP/OBS DSCHRG MGMT >30: CPT

## 2017-05-29 PROCEDURE — 81025 URINE PREGNANCY TEST: CPT

## 2017-05-29 PROCEDURE — 80307 DRUG TEST PRSMV CHEM ANLYZR: CPT

## 2017-05-29 PROCEDURE — 73590 X-RAY EXAM OF LOWER LEG: CPT

## 2017-05-29 PROCEDURE — 36415 COLL VENOUS BLD VENIPUNCTURE: CPT

## 2017-05-29 PROCEDURE — 80048 BASIC METABOLIC PNL TOTAL CA: CPT

## 2017-05-29 PROCEDURE — 83605 ASSAY OF LACTIC ACID: CPT

## 2017-05-29 PROCEDURE — 96374 THER/PROPH/DIAG INJ IV PUSH: CPT

## 2017-05-29 PROCEDURE — 84145 PROCALCITONIN (PCT): CPT

## 2017-05-29 PROCEDURE — 87205 SMEAR GRAM STAIN: CPT

## 2017-05-29 PROCEDURE — 87070 CULTURE OTHR SPECIMN AEROBIC: CPT

## 2017-05-29 PROCEDURE — 86140 C-REACTIVE PROTEIN: CPT

## 2017-05-29 PROCEDURE — 87186 SC STD MICRODIL/AGAR DIL: CPT

## 2017-05-29 PROCEDURE — 87102 FUNGUS ISOLATION CULTURE: CPT

## 2017-05-29 PROCEDURE — 85652 RBC SED RATE AUTOMATED: CPT

## 2017-05-29 PROCEDURE — 80061 LIPID PANEL: CPT

## 2017-05-29 PROCEDURE — 80053 COMPREHEN METABOLIC PANEL: CPT

## 2017-05-29 PROCEDURE — 99285 EMERGENCY DEPT VISIT HI MDM: CPT | Mod: 25

## 2017-05-29 PROCEDURE — 71045 X-RAY EXAM CHEST 1 VIEW: CPT

## 2017-05-29 PROCEDURE — 97163 PT EVAL HIGH COMPLEX 45 MIN: CPT

## 2017-05-29 PROCEDURE — 85027 COMPLETE CBC AUTOMATED: CPT

## 2017-05-29 PROCEDURE — 93005 ELECTROCARDIOGRAM TRACING: CPT

## 2017-05-29 PROCEDURE — 87040 BLOOD CULTURE FOR BACTERIA: CPT

## 2017-05-29 RX ADMIN — ARIPIPRAZOLE 5 MILLIGRAM(S): 15 TABLET ORAL at 11:09

## 2017-05-29 RX ADMIN — DEXTROAMPHETAMINE SACCHARATE, AMPHETAMINE ASPARTATE, DEXTROAMPHETAMINE SULFATE AND AMPHETAMINE SULFATE 20 MILLIGRAM(S): 1.875; 1.875; 1.875; 1.875 TABLET ORAL at 11:10

## 2017-05-29 RX ADMIN — Medication 1 PATCH: at 11:07

## 2017-05-29 RX ADMIN — Medication 1 PATCH: at 11:10

## 2017-05-29 RX ADMIN — ENOXAPARIN SODIUM 30 MILLIGRAM(S): 100 INJECTION SUBCUTANEOUS at 06:23

## 2017-05-29 RX ADMIN — CEFTRIAXONE 100 GRAM(S): 500 INJECTION, POWDER, FOR SOLUTION INTRAMUSCULAR; INTRAVENOUS at 09:09

## 2017-05-29 RX ADMIN — Medication 225 MILLIGRAM(S): at 11:09

## 2017-06-21 LAB
CULTURE RESULTS: SIGNIFICANT CHANGE UP
SPECIMEN SOURCE: SIGNIFICANT CHANGE UP

## 2020-11-20 NOTE — H&P ADULT - CONSTITUTIONAL DETAILS
"Requested Prescriptions   Pending Prescriptions Disp Refills    DULERA 200-5 MCG/ACT inhaler [Pharmacy Med Name: Dulera 200-5 MCG/ACT Inhalation Aerosol] 13 g 0     Sig: Inhale 2 puffs by mouth twice daily       Long-Acting Beta Agonist Inhalers Protocol  Failed - 11/20/2020 11:17 AM        Failed - Order for Serevent, Striverdi, or Foradil and pt has steroid inhaler        Passed - Patient is age 12 or older        Passed - Recent (12 mo) or future (30 days) visit within the authorizing provider's specialty     Patient has had an office visit with the authorizing provider or a provider within the authorizing providers department within the previous 12 mos or has a future within next 30 days. See \"Patient Info\" tab in inbasket, or \"Choose Columns\" in Meds & Orders section of the refill encounter.              Passed - Medication is active on med list       Inhaled Steroids Protocol Passed - 11/20/2020 11:17 AM        Passed - Patient is age 12 or older        Passed - Recent (12 mo) or future (30 days) visit within the authorizing provider's specialty     Patient has had an office visit with the authorizing provider or a provider within the authorizing providers department within the previous 12 mos or has a future within next 30 days. See \"Patient Info\" tab in inbasket, or \"Choose Columns\" in Meds & Orders section of the refill encounter.              Passed - Medication is active on med list             "
well-nourished/well-groomed/no distress/well-developed

## 2022-06-01 NOTE — ED ADULT NURSE NOTE - NS TRANSFER PATIENT BELONGINGS
presented to the ED BIB EMS after change in MS/difficulty speaking, presentation similar to prior seizures. On admission head Ct showed no acute changes, EEG c/w status epilepticus, non convulsive. keppra increased to 1500 mg BID; Vimpat added, now 200 mg BID, video EEG showed persistent generalized s/w yesterday fosphenytoin added, with improvement on EEG.
Clothing

## 2024-04-02 NOTE — PHYSICAL THERAPY INITIAL EVALUATION ADULT - LEVEL OF INDEPENDENCE: GAIT, REHAB EVAL
184.2               10/12/22 Phentermine #1                          176.0               11/16/22 Phentermine #2                          176.0               12/14/22 Phentermine #3                          167.8               1/19/23   Contrave - did not work - taper                          168.0               3/6/23                          166.4               4/17/23   Qsymia - made her nauseous -> Phentermine                          166.6               5/16/23   Phentermine contd.                          162.0               6/19/23   Phentermine continued                          161.6               7/19/23   Wellbutrin - stopped after running                          171.0               11/27/23  Phentermine #1     164.4 lbs  12/22/23  Phentemine    164.6  1/23/24     Phentermine#3    157.2  2/27/24     Phentermine    153.4  4/2/24      Phentermine    Total weight change to date: -30.8 lbs.   Average daily energy variance:  10/16/2022 - 11/16/2022: -6.6 lbs (91681 Micheal)/30 d = -770 Micheal/d deficit.  11/16/2022 - 12/14/2022: +0.0 lbs (0 Micheal)/28 d = +0 Micheal/d excess.  12/14/2022 - 1/19/2023: -8.2 lbs (31229 Micheal)/36 d = -797 Micheal/d deficit.  3/6/2023 - 4/17/2023: -1.6 lbs (5600 Micheal)/42 d = -133 Micheal/d deficit.  4/17/2023 - 5/16/2023: +0.2 lbs (700 Micheal)/29 d = +24 Micheal/d excess.  5/16/2023 - 6/19/2023: -4.6 lbs (72786 Micheal)/34 d = -474 Micheal/d deficit.  6/19/2023 - 7/19/2023: -0.4 lbs (1400 Micheal)/30 d = -47 Micheal/d deficit.  7/19/2023 - 11/27/2023: +9.4 lbs (05156 Micheal)/131 d = +251 Micheal/d excess.    11/27/2023 - 12/22/2023: - 6.6 lbs (23,100 Micheal)/25 d = - 924 Micheal/d deficit    12/22/2023 - 1/23/2024: +0.2 lbs (700 Micheal)/32 d = +22 Micheal/d excess.  1/23/2024 - 2/27/2024: -7.4 lbs (67775 Micheal)/35 d = -740 Micheal/d deficit.   2/27/2024 - 4/1/2024: -3.8 lbs (60710 Micheal)/34 d = -392 Micheal/d deficit     11/27/23 - 2/27/24 : 171 - 157.2 = -8.1% change in body wt in about 3 months.    Patient's estimated daily energy need (DEN) = 1776 Micheal/d =  independent

## 2025-01-31 NOTE — ED ADULT NURSE NOTE - CAS EDN INTEG ASSESS
Outreach attempts to coordinate scheduling on the patient's Service to Behavioral Health order in workqueue [id number] requested on 1/22/2025 have been conducted.    This order is still valid for one year from date it was placed. Patient may call Central Scheduling at [phone] and we would be happy to assist them.      - - -